# Patient Record
Sex: MALE | Race: BLACK OR AFRICAN AMERICAN | NOT HISPANIC OR LATINO | Employment: OTHER | URBAN - METROPOLITAN AREA
[De-identification: names, ages, dates, MRNs, and addresses within clinical notes are randomized per-mention and may not be internally consistent; named-entity substitution may affect disease eponyms.]

---

## 2018-10-22 ENCOUNTER — HOSPITAL ENCOUNTER (OUTPATIENT)
Dept: ORTHOPEDIC SURGERY | Facility: CLINIC | Age: 63
Discharge: HOME | End: 2018-10-22
Attending: NEUROLOGICAL SURGERY | Admitting: NEUROLOGICAL SURGERY

## 2020-08-13 ENCOUNTER — TRANSCRIBE ORDERS (OUTPATIENT)
Dept: ADMINISTRATIVE | Facility: HOSPITAL | Age: 65
End: 2020-08-13

## 2020-08-13 DIAGNOSIS — G62.9 PERIPHERAL NERVE DISORDER: Primary | ICD-10-CM

## 2020-08-21 ENCOUNTER — HOSPITAL ENCOUNTER (OUTPATIENT)
Dept: CARDIOLOGY | Facility: HOSPITAL | Age: 65
Discharge: HOME | End: 2020-08-21
Admitting: NURSE PRACTITIONER

## 2020-08-21 DIAGNOSIS — G62.9 PERIPHERAL NERVE DISORDER: ICD-10-CM

## 2020-08-21 LAB
BH CV LOWER ARTERIAL LEFT ABI RATIO: 1.22
BH CV LOWER ARTERIAL LEFT DORSALIS PEDIS SYS MAX: 147 MMHG
BH CV LOWER ARTERIAL LEFT GREAT TOE SYS MAX: 143 MMHG
BH CV LOWER ARTERIAL LEFT POST TIBIAL SYS MAX: 148 MMHG
BH CV LOWER ARTERIAL LEFT TBI RATIO: 1.18
BH CV LOWER ARTERIAL RIGHT ABI RATIO: 1.12
BH CV LOWER ARTERIAL RIGHT DORSALIS PEDIS SYS MAX: 135 MMHG
BH CV LOWER ARTERIAL RIGHT GREAT TOE SYS MAX: 95 MMHG
BH CV LOWER ARTERIAL RIGHT POST TIBIAL SYS MAX: 129 MMHG
BH CV LOWER ARTERIAL RIGHT TBI RATIO: 0.79
UPPER ARTERIAL LEFT ARM BRACHIAL SYS MAX: 117 MMHG
UPPER ARTERIAL RIGHT ARM BRACHIAL SYS MAX: 121 MMHG

## 2020-08-21 PROCEDURE — 93922 UPR/L XTREMITY ART 2 LEVELS: CPT

## 2020-08-31 ENCOUNTER — OFFICE VISIT (OUTPATIENT)
Dept: SURGERY | Facility: CLINIC | Age: 65
End: 2020-08-31

## 2020-08-31 VITALS
HEART RATE: 77 BPM | TEMPERATURE: 96.8 F | OXYGEN SATURATION: 99 % | BODY MASS INDEX: 31.69 KG/M2 | SYSTOLIC BLOOD PRESSURE: 122 MMHG | HEIGHT: 72 IN | DIASTOLIC BLOOD PRESSURE: 78 MMHG | WEIGHT: 234 LBS

## 2020-08-31 DIAGNOSIS — K43.2 RECURRENT INCISIONAL HERNIA: Primary | ICD-10-CM

## 2020-08-31 PROCEDURE — 99204 OFFICE O/P NEW MOD 45 MIN: CPT | Performed by: SURGERY

## 2020-08-31 RX ORDER — OMEPRAZOLE 20 MG/1
20 CAPSULE, DELAYED RELEASE ORAL DAILY
COMMUNITY

## 2020-08-31 RX ORDER — COLCHICINE 0.6 MG/1
0.6 TABLET ORAL 2 TIMES DAILY
COMMUNITY
Start: 2020-07-14

## 2020-08-31 RX ORDER — LISINOPRIL AND HYDROCHLOROTHIAZIDE 20; 12.5 MG/1; MG/1
2 TABLET ORAL DAILY
COMMUNITY
Start: 2020-08-13

## 2020-08-31 NOTE — PROGRESS NOTES
"Subjective   Jesus Main is a 65 y.o. male.   Chief Complaint   Patient presents with   • Hernia     ventral hernia     /78   Pulse 77   Temp 96.8 °F (36 °C) (Temporal)   Ht 182.9 cm (72\")   Wt 106 kg (234 lb)   SpO2 99%   BMI 31.74 kg/m²     HISTORY OF PRESENT ILLNESS:  Patient is a very pleasant 65-year-old gentleman.  He had a hernia repair by Dr. Beth navas about 10 years ago.  About 8 or 9 months ago he noticed a bulge recurring in his epigastric region.  He also noticed a burning pain in this area.  He denies any obstructive symptoms.  The defect is been getting bigger and he therefore was referred here by his primary care provider for evaluation and repair of recurrent hernia      The following portions of the patient's history were reviewed and updated as appropriate: allergies, current medications, past family history, past medical history, past social history, past surgical history and problem list.    Review of Systems   Constitutional: Negative for activity change and chills.   HENT: Negative for sore throat and voice change.    Eyes: Negative for blurred vision.   Respiratory: Negative for chest tightness.    Cardiovascular: Negative for chest pain.   Gastrointestinal: Positive for abdominal distention. Negative for abdominal pain.   Endocrine: Negative for cold intolerance.   Genitourinary: Negative for urgency.   Musculoskeletal: Negative for arthralgias.   Skin: Negative for color change.   Neurological: Negative for syncope and confusion.   Hematological: Does not bruise/bleed easily.   Psychiatric/Behavioral: Negative for hallucinations.       Objective   Physical Exam   Constitutional: He is oriented to person, place, and time. He appears well-developed and well-nourished.   HENT:   Head: Normocephalic and atraumatic.   Eyes: EOM are normal.   Neck: Normal range of motion.   Cardiovascular: Normal rate.   Pulmonary/Chest: Effort normal.   Abdominal: Soft.   Palpable " supraumbilical recurrent ventral incisional hernia the hernia is about 8 to 9 cm across however the defect is not palpable.   Genitourinary:   Genitourinary Comments: Deferred   Musculoskeletal: Normal range of motion.   Neurological: He is alert and oriented to person, place, and time.   Skin: Skin is warm and dry.   Psychiatric: He has a normal mood and affect.         Assessment/Plan   Jesus was seen today for hernia.    Diagnoses and all orders for this visit:    Recurrent incisional hernia    Prior to making a decision about treatment planning I would like to check a CT abdomen pelvis to better assess the nature of the hernia defect in the abdominal wall.  This will help me plan the nature of the pair and whether or not a component separation will be necessary.  I will get this set up for him and see him in follow-up after that.    Lesia Gamboa MD  8/31/2020  10:07 AM

## 2020-09-02 ENCOUNTER — LAB (OUTPATIENT)
Dept: LAB | Facility: HOSPITAL | Age: 65
End: 2020-09-02

## 2020-09-02 ENCOUNTER — OFFICE VISIT (OUTPATIENT)
Dept: PODIATRY | Facility: CLINIC | Age: 65
End: 2020-09-02

## 2020-09-02 VITALS
SYSTOLIC BLOOD PRESSURE: 107 MMHG | BODY MASS INDEX: 31.69 KG/M2 | DIASTOLIC BLOOD PRESSURE: 74 MMHG | WEIGHT: 234 LBS | HEART RATE: 52 BPM | HEIGHT: 72 IN

## 2020-09-02 DIAGNOSIS — M76.62 ACHILLES TENDINITIS OF BOTH LOWER EXTREMITIES: ICD-10-CM

## 2020-09-02 DIAGNOSIS — M19.071 ARTHRITIS OF BOTH FEET: ICD-10-CM

## 2020-09-02 DIAGNOSIS — M19.072 ARTHRITIS OF BOTH FEET: ICD-10-CM

## 2020-09-02 DIAGNOSIS — M10.072 ACUTE IDIOPATHIC GOUT OF LEFT ANKLE: ICD-10-CM

## 2020-09-02 DIAGNOSIS — K43.2 RECURRENT INCISIONAL HERNIA: Primary | ICD-10-CM

## 2020-09-02 DIAGNOSIS — M10.9 ACUTE GOUT OF ANKLE, UNSPECIFIED CAUSE, UNSPECIFIED LATERALITY: ICD-10-CM

## 2020-09-02 DIAGNOSIS — M25.572 ACUTE BILATERAL ANKLE PAIN: ICD-10-CM

## 2020-09-02 DIAGNOSIS — M25.572 ACUTE BILATERAL ANKLE PAIN: Primary | ICD-10-CM

## 2020-09-02 DIAGNOSIS — M25.571 ACUTE BILATERAL ANKLE PAIN: ICD-10-CM

## 2020-09-02 DIAGNOSIS — M76.61 ACHILLES TENDINITIS OF BOTH LOWER EXTREMITIES: ICD-10-CM

## 2020-09-02 DIAGNOSIS — M25.571 ACUTE BILATERAL ANKLE PAIN: Primary | ICD-10-CM

## 2020-09-02 LAB
ANION GAP SERPL CALCULATED.3IONS-SCNC: 8.9 MMOL/L (ref 5–15)
BASOPHILS # BLD AUTO: 0.04 10*3/MM3 (ref 0–0.2)
BASOPHILS NFR BLD AUTO: 0.5 % (ref 0–1.5)
BUN SERPL-MCNC: 18 MG/DL (ref 8–23)
BUN/CREAT SERPL: 12.4 (ref 7–25)
CALCIUM SPEC-SCNC: 9.7 MG/DL (ref 8.6–10.5)
CHLORIDE SERPL-SCNC: 103 MMOL/L (ref 98–107)
CO2 SERPL-SCNC: 26.1 MMOL/L (ref 22–29)
CREAT SERPL-MCNC: 1.45 MG/DL (ref 0.76–1.27)
CRP SERPL-MCNC: 3 MG/DL (ref 0–0.5)
DEPRECATED RDW RBC AUTO: 40.9 FL (ref 37–54)
EOSINOPHIL # BLD AUTO: 0.21 10*3/MM3 (ref 0–0.4)
EOSINOPHIL NFR BLD AUTO: 2.9 % (ref 0.3–6.2)
ERYTHROCYTE [DISTWIDTH] IN BLOOD BY AUTOMATED COUNT: 12.7 % (ref 12.3–15.4)
ERYTHROCYTE [SEDIMENTATION RATE] IN BLOOD: 59 MM/HR (ref 0–20)
GFR SERPL CREATININE-BSD FRML MDRD: 59 ML/MIN/1.73
GLUCOSE SERPL-MCNC: 96 MG/DL (ref 65–99)
HCT VFR BLD AUTO: 36.3 % (ref 37.5–51)
HGB BLD-MCNC: 12.4 G/DL (ref 13–17.7)
IMM GRANULOCYTES # BLD AUTO: 0.03 10*3/MM3 (ref 0–0.05)
IMM GRANULOCYTES NFR BLD AUTO: 0.4 % (ref 0–0.5)
LYMPHOCYTES # BLD AUTO: 0.94 10*3/MM3 (ref 0.7–3.1)
LYMPHOCYTES NFR BLD AUTO: 12.8 % (ref 19.6–45.3)
MCH RBC QN AUTO: 30.1 PG (ref 26.6–33)
MCHC RBC AUTO-ENTMCNC: 34.2 G/DL (ref 31.5–35.7)
MCV RBC AUTO: 88.1 FL (ref 79–97)
MONOCYTES # BLD AUTO: 0.94 10*3/MM3 (ref 0.1–0.9)
MONOCYTES NFR BLD AUTO: 12.8 % (ref 5–12)
NEUTROPHILS NFR BLD AUTO: 5.2 10*3/MM3 (ref 1.7–7)
NEUTROPHILS NFR BLD AUTO: 70.6 % (ref 42.7–76)
NRBC BLD AUTO-RTO: 0 /100 WBC (ref 0–0.2)
PLATELET # BLD AUTO: 209 10*3/MM3 (ref 140–450)
PMV BLD AUTO: 11.7 FL (ref 6–12)
POTASSIUM SERPL-SCNC: 4 MMOL/L (ref 3.5–5.2)
RBC # BLD AUTO: 4.12 10*6/MM3 (ref 4.14–5.8)
SODIUM SERPL-SCNC: 138 MMOL/L (ref 136–145)
URATE SERPL-MCNC: 10.5 MG/DL (ref 3.4–7)
WBC # BLD AUTO: 7.36 10*3/MM3 (ref 3.4–10.8)

## 2020-09-02 PROCEDURE — 36415 COLL VENOUS BLD VENIPUNCTURE: CPT

## 2020-09-02 PROCEDURE — 99203 OFFICE O/P NEW LOW 30 MIN: CPT | Performed by: PODIATRIST

## 2020-09-02 PROCEDURE — 85652 RBC SED RATE AUTOMATED: CPT

## 2020-09-02 PROCEDURE — 85025 COMPLETE CBC W/AUTO DIFF WBC: CPT

## 2020-09-02 PROCEDURE — 84550 ASSAY OF BLOOD/URIC ACID: CPT

## 2020-09-02 PROCEDURE — 97760 ORTHOTIC MGMT&TRAING 1ST ENC: CPT | Performed by: PODIATRIST

## 2020-09-02 PROCEDURE — 80048 BASIC METABOLIC PNL TOTAL CA: CPT

## 2020-09-02 PROCEDURE — 86140 C-REACTIVE PROTEIN: CPT

## 2020-09-02 RX ORDER — CLOTRIMAZOLE AND BETAMETHASONE DIPROPIONATE 10; .64 MG/G; MG/G
CREAM TOPICAL 2 TIMES DAILY
Qty: 45 G | Refills: 1 | Status: SHIPPED | OUTPATIENT
Start: 2020-09-02 | End: 2021-06-10

## 2020-09-02 RX ORDER — METHYLPREDNISOLONE 4 MG/1
TABLET ORAL
Qty: 21 TABLET | Refills: 0 | Status: SHIPPED | OUTPATIENT
Start: 2020-09-02 | End: 2020-09-16

## 2020-09-03 NOTE — PATIENT INSTRUCTIONS
Gout    Gout is a condition that causes painful swelling of the joints. Gout is a type of inflammation of the joints (arthritis). This condition is caused by having too much uric acid in the body. Uric acid is a chemical that forms when the body breaks down substances called purines. Purines are important for building body proteins.  When the body has too much uric acid, sharp crystals can form and build up inside the joints. This causes pain and swelling. Gout attacks can happen quickly and may be very painful (acute gout). Over time, the attacks can affect more joints and become more frequent (chronic gout). Gout can also cause uric acid to build up under the skin and inside the kidneys.  What are the causes?  This condition is caused by too much uric acid in your blood. This can happen because:  · Your kidneys do not remove enough uric acid from your blood. This is the most common cause.  · Your body makes too much uric acid. This can happen with some cancers and cancer treatments. It can also occur if your body is breaking down too many red blood cells (hemolytic anemia).  · You eat too many foods that are high in purines. These foods include organ meats and some seafood. Alcohol, especially beer, is also high in purines.  A gout attack may be triggered by trauma or stress.  What increases the risk?  You are more likely to develop this condition if you:  · Have a family history of gout.  · Are male and middle-aged.  · Are female and have gone through menopause.  · Are obese.  · Frequently drink alcohol, especially beer.  · Are dehydrated.  · Lose weight too quickly.  · Have an organ transplant.  · Have lead poisoning.  · Take certain medicines, including aspirin, cyclosporine, diuretics, levodopa, and niacin.  · Have kidney disease.  · Have a skin condition called psoriasis.  What are the signs or symptoms?  An attack of acute gout happens quickly. It usually occurs in just one joint. The most common place is  the big toe. Attacks often start at night. Other joints that may be affected include joints of the feet, ankle, knee, fingers, wrist, or elbow. Symptoms of this condition may include:  · Severe pain.  · Warmth.  · Swelling.  · Stiffness.  · Tenderness. The affected joint may be very painful to touch.  · Shiny, red, or purple skin.  · Chills and fever.  Chronic gout may cause symptoms more frequently. More joints may be involved. You may also have white or yellow lumps (tophi) on your hands or feet or in other areas near your joints.  How is this diagnosed?  This condition is diagnosed based on your symptoms, medical history, and physical exam. You may have tests, such as:  · Blood tests to measure uric acid levels.  · Removal of joint fluid with a thin needle (aspiration) to look for uric acid crystals.  · X-rays to look for joint damage.  How is this treated?  Treatment for this condition has two phases: treating an acute attack and preventing future attacks. Acute gout treatment may include medicines to reduce pain and swelling, including:  · NSAIDs.  · Steroids. These are strong anti-inflammatory medicines that can be taken by mouth (orally) or injected into a joint.  · Colchicine. This medicine relieves pain and swelling when it is taken soon after an attack. It can be given by mouth or through an IV.  Preventive treatment may include:  · Daily use of smaller doses of NSAIDs or colchicine.  · Use of a medicine that reduces uric acid levels in your blood.  · Changes to your diet. You may need to see a dietitian about what to eat and drink to prevent gout.  Follow these instructions at home:  During a gout attack    · If directed, put ice on the affected area:  ? Put ice in a plastic bag.  ? Place a towel between your skin and the bag.  ? Leave the ice on for 20 minutes, 2-3 times a day.  · Raise (elevate) the affected joint above the level of your heart as often as possible.  · Rest the joint as much as possible.  If the affected joint is in your leg, you may be given crutches to use.  · Follow instructions from your health care provider about eating or drinking restrictions.  Avoiding future gout attacks  · Follow a low-purine diet as told by your dietitian or health care provider. Avoid foods and drinks that are high in purines, including liver, kidney, anchovies, asparagus, herring, mushrooms, mussels, and beer.  · Maintain a healthy weight or lose weight if you are overweight. If you want to lose weight, talk with your health care provider. It is important that you do not lose weight too quickly.  · Start or maintain an exercise program as told by your health care provider.  Eating and drinking  · Drink enough fluids to keep your urine pale yellow.  · If you drink alcohol:  ? Limit how much you use to:  § 0-1 drink a day for women.  § 0-2 drinks a day for men.  ? Be aware of how much alcohol is in your drink. In the [place] one drink equals one 12 oz bottle of beer (355 mL) one 5 oz glass of wine (148 mL), or one 1½ oz glass of hard liquor (44 mL).  General instructions  · Take over-the-counter and prescription medicines only as told by your health care provider.  · Do not drive or use heavy machinery while taking prescription pain medicine.  · Return to your normal activities as told by your health care provider. Ask your health care provider what activities are safe for you.  · Keep all follow-up visits as told by your health care provider. This is important.  Contact a health care provider if you have:  · Another gout attack.  · Continuing symptoms of a gout attack after 10 days of treatment.  · Side effects from your medicines.  · Chills or a fever.  · Burning pain when you urinate.  · Pain in your lower back or belly.  Get help right away if you:  · Have severe or uncontrolled pain.  · Cannot urinate.  Summary  · Gout is painful swelling of the joints caused by inflammation.  · The most common site of pain is the big  toe, but it can affect other joints in the body.  · Medicines and dietary changes can help to prevent and treat gout attacks.  This information is not intended to replace advice given to you by your health care provider. Make sure you discuss any questions you have with your health care provider.  Document Released: 12/15/2001 Document Revised: 07/10/2019 Document Reviewed: 07/10/2019  Elsevier Patient Education © 2020 [employer]

## 2020-09-03 NOTE — PROGRESS NOTES
09/02/2020  Foot and Ankle Surgery - New Patient   Provider: Dr. Edmundo Parra DPM  Location: [facility]    Subjective:  Jesus Main is a 65 y.o. male.     Chief Complaint   Patient presents with   • Right Ankle - Pain   • Left Ankle - Pain       HPI: Patient is a 65-year-old male that presents with bilateral ankle pain, left significantly worse than right.  He states that the symptoms have been intermittently present for approximately 8 to 9 months.  Recently he has noticed significant exacerbation since his primary care physician discontinued the colchicine.  He has been diagnosed with gout in the past.  He states that he was taking the colchicine on a daily basis a few times a day.  He is unaware of any previous treatment with allopurinol or Uloric.  He localizes the majority of the discomfort to the Achilles tendon and peroneal tendon region of the left ankle.  He rates the pain an 8 out of 10 today.  Patient has been soaking his feet and ice which has offered mild improvement.  He denies any recent injury.  He also complains of dry skin/eczema to the medial aspect of the right ankle.  He has tried over-the-counter topicals with no improvement.    No Known Allergies    Past Medical History:   Diagnosis Date   • Gout        Past Surgical History:   Procedure Laterality Date   • HERNIA REPAIR         Family History   Problem Relation Age of Onset   • Hypertension Mother    • Heart disease Mother    • Cancer Father        Social History     Socioeconomic History   • Marital status:      Spouse name: Not on file   • Number of children: Not on file   • Years of education: Not on file   • Highest education level: Not on file   Tobacco Use   • Smoking status: Never Smoker   • Smokeless tobacco: Never Used   Substance and Sexual Activity   • Alcohol use: Yes     Comment: occasional   • Drug use: Never   • Sexual activity: Defer        Current Outpatient Medications on File Prior to Visit  "  Medication Sig Dispense Refill   • colchicine 0.6 MG tablet Take 0.6 mg by mouth 2 (Two) Times a Day.     • lisinopril-hydrochlorothiazide (PRINZIDE,ZESTORETIC) 20-12.5 MG per tablet Take 2 tablets by mouth Daily.     • Multiple Vitamins-Minerals (VISIVITES) tablet VISIVITES TABS     • omeprazole (priLOSEC) 20 MG capsule Take 20 mg by mouth Daily.       No current facility-administered medications on file prior to visit.        Review of Systems:  General: Denies fever, chills, fatigue, and weakness.  Eyes: Denies vision loss, blurry vision, and excessive redness.  ENT: Denies hearing issues and difficulty swallowing.  Cardiovascular: Denies palpitations, chest pain, or syncopal episodes.  Respiratory: Denies shortness of breath, wheezing, and coughing.  GI: Denies abdominal pain, nausea, and vomiting.   : Denies frequency, hematuria, and urgency.  Musculoskeletal: + Bilateral ankle pain  Derm: Denies rash, open wounds, or suspicious lesions.  Neuro: Denies headaches, numbness, loss of coordination, and tremors.  Psych: Denies anxiety and depression.  Endocrine: Denies temperature intolerance and changes in appetite.  Heme: Denies bleeding disorders or abnormal bruising.     Objective   /74   Pulse 52   Ht 182.9 cm (72\")   Wt 106 kg (234 lb)   BMI 31.74 kg/m²     Foot/Ankle Exam:       General:   Appearance: appears stated age and healthy    Orientation: AAOx3    Affect: appropriate    Gait: antalgic      VASCULAR      Right Foot Vascularity   Normal vascular exam    Dorsalis pedis:  2+  Posterior tibial:  2+  Skin Temperature: warm    Edema Grading:  None and 1+  CFT:  < 3 seconds  Pedal Hair Growth:  Present     Left Foot Vascularity   Normal vascular exam    Dorsalis pedis:  2+  Posterior tibial:  2+  Skin Temperature: warm    Edema Grading:  None and 2+  CFT:  < 3 seconds  Pedal Hair Growth:  Present      NEUROLOGIC     Right Foot Neurologic   Light touch sensation:  Normal  Hot/Cold sensation: " normal    Achilles reflex:  2+     Left Foot Neurologic   Light touch sensation:  Normal  Hot/cold sensation: normal    Achilles reflex:  2+     MUSCULOSKELETAL      Right Foot Musculoskeletal   Ecchymosis:  None  Tenderness: calcaneus tenderness and achilles    Arch:  Pes planus  Hallux valgus: Yes    Hallux limitus: Yes       Left Foot Musculoskeletal   Ecchymosis:  None  Tenderness: calcaneus tenderness and subtalar joint    Tenderness comment:  Significant discomfort with palpation to the heel and lateral aspect of the ankle  Arch:  Pes planus  Hallux valgus: Yes    Hallux limitus: Yes       MUSCLE STRENGTH     Right Foot Muscle Strength   Normal strength    Foot dorsiflexion:  5  Foot plantar flexion:  5  Foot inversion:  5  Foot eversion:  5     Left Foot Muscle Strength   Normal strength    Foot dorsiflexion:  5  Foot plantar flexion:  5  Foot inversion:  5  Foot eversion:  5     DERMATOLOGIC     Right Foot Dermatologic   Skin: dry skin    Skin comment:  Eczema noted to the medial aspect of the ankle     Left Foot Dermatologic   Skin: skin intact    Skin comment:  Mild calor to the posterior ankle region      Right Foot Additional Comments Mild discomfort to the Achilles tendon insertion.  Moderate soft tissue and joint rigidity to both foot and ankle.  Moderate equinus contracture with knee extended and flexed, bilateral      Left Foot Additional Comments: Moderate discomfort with palpation to the posterior lateral aspect of the ankle and Achilles tendon region.      Assessment/Plan   Jesus was seen today for pain and pain.    Diagnoses and all orders for this visit:    Acute bilateral ankle pain  -     XR Ankle 3+ View Bilateral  -     CBC & Differential; Future  -     Basic Metabolic Panel; Future  -     Sedimentation Rate; Future  -     C-reactive Protein; Future  -     Uric Acid; Future    Acute idiopathic gout of left ankle  -     CBC & Differential; Future  -     Basic Metabolic Panel; Future  -      Sedimentation Rate; Future  -     C-reactive Protein; Future  -     Uric Acid; Future    Arthritis of both feet    Achilles tendinitis of both lower extremities    Other orders  -     methylPREDNISolone (MEDROL) 4 MG dose pack; Take as directed  -     clotrimazole-betamethasone (LOTRISONE) 1-0.05 % cream; Apply  topically to the appropriate area as directed 2 (Two) Times a Day. Apply to affected area twice daily      Patient was referred to me by his primary care physician for evaluation of bilateral ankle pain, left greater than right.  He does have swelling, calor, and discomfort involving the posterior lateral aspect of the left ankle.  He denies any injury but states that he has been diagnosed and treated for gout in the past.  He was recently taken off his colchicine and has noticed significant increase in pain involving the ankle.  Imaging was reviewed showing moderate degenerative changes as well as spur formation to the posterior aspect of the calcaneus.  No fractures or dislocations are identified.  I explained that his symptoms are consistent with inflammation and could be secondary to gout.  I have recommended that we proceed with lab work including CBC, BMP, ESR, CRP, and uric acid.  I have recommended that he proceed with a cam boot for offloading.  Greater than 15 minutes was spent reviewing the proper use and effects of the boot.  I have also prescribed a Medrol Dosepak as well as Lotrisone for the skin changes involving his right ankle.  I would like him to monitor his improvement and follow-up with me in 1 to 2 weeks for reevaluation and bilateral weightbearing foot imaging.    Orders Placed This Encounter   Procedures   • XR Ankle 3+ View Bilateral     Order Specific Question:   Reason for Exam:     Answer:   Bilateral ankle pain with swelling and pain in the back of the heel RM 13 WB     Order Specific Question:   Does this patient have a diabetic monitoring/medication delivering device on?      Answer:   No   • Basic Metabolic Panel     Standing Status:   Future     Number of Occurrences:   1     Standing Expiration Date:   9/3/2021   • Sedimentation Rate     Standing Status:   Future     Number of Occurrences:   1     Standing Expiration Date:   9/3/2021   • C-reactive Protein     Standing Status:   Future     Number of Occurrences:   1     Standing Expiration Date:   9/3/2021   • Uric Acid     Standing Status:   Future     Number of Occurrences:   1     Standing Expiration Date:   9/3/2021   • CBC & Differential     Standing Status:   Future     Number of Occurrences:   1     Standing Expiration Date:   9/3/2021     Order Specific Question:   Manual Differential     Answer:   No        Note is dictated utilizing voice recognition software. Unfortunately this leads to occasional typographical errors. I apologize in advance if the situation occurs. If questions occur please do not hesitate to call our office.

## 2020-09-12 ENCOUNTER — HOSPITAL ENCOUNTER (OUTPATIENT)
Dept: CT IMAGING | Facility: HOSPITAL | Age: 65
Discharge: HOME | End: 2020-09-12
Admitting: SURGERY

## 2020-09-12 DIAGNOSIS — K43.2 RECURRENT INCISIONAL HERNIA: ICD-10-CM

## 2020-09-12 PROCEDURE — 0 IOPAMIDOL PER 1 ML: Performed by: SURGERY

## 2020-09-12 PROCEDURE — 74177 CT ABD & PELVIS W/CONTRAST: CPT

## 2020-09-12 RX ADMIN — IOPAMIDOL 100 ML: 755 INJECTION, SOLUTION INTRAVENOUS at 11:45

## 2020-09-16 ENCOUNTER — OFFICE VISIT (OUTPATIENT)
Dept: PODIATRY | Facility: CLINIC | Age: 65
End: 2020-09-16

## 2020-09-16 VITALS
SYSTOLIC BLOOD PRESSURE: 112 MMHG | BODY MASS INDEX: 31.06 KG/M2 | HEART RATE: 77 BPM | WEIGHT: 229 LBS | DIASTOLIC BLOOD PRESSURE: 74 MMHG

## 2020-09-16 DIAGNOSIS — M76.62 ACHILLES TENDINITIS OF BOTH LOWER EXTREMITIES: ICD-10-CM

## 2020-09-16 DIAGNOSIS — M10.072 ACUTE IDIOPATHIC GOUT OF LEFT ANKLE: ICD-10-CM

## 2020-09-16 DIAGNOSIS — M25.571 ACUTE BILATERAL ANKLE PAIN: Primary | ICD-10-CM

## 2020-09-16 DIAGNOSIS — M19.072 ARTHRITIS OF BOTH FEET: ICD-10-CM

## 2020-09-16 DIAGNOSIS — M19.071 ARTHRITIS OF BOTH FEET: ICD-10-CM

## 2020-09-16 DIAGNOSIS — M25.572 ACUTE BILATERAL ANKLE PAIN: Primary | ICD-10-CM

## 2020-09-16 DIAGNOSIS — M76.61 ACHILLES TENDINITIS OF BOTH LOWER EXTREMITIES: ICD-10-CM

## 2020-09-16 PROCEDURE — 99213 OFFICE O/P EST LOW 20 MIN: CPT | Performed by: PODIATRIST

## 2020-09-17 NOTE — PROGRESS NOTES
09/16/2020  Foot and Ankle Surgery - Established Patient/Follow-up  Provider: Dr. Edmundo Parra DPM  Location: [facility]    Subjective:  Jesus Main is a 65 y.o. male.     Chief Complaint   Patient presents with   • Right Ankle - Follow-up   • Left Ankle - Follow-up       HPI: Patient is feeling much better at this time.  He no longer has substantial pain involving the Achilles tendon or the feet.  He did take the Medrol Dosepak and also feels that the Lotrisone is helping the medial aspect of the right ankle.  Denies any other issues today.  He has been performing daily activities without any significant pain or limitation.    No Known Allergies    Current Outpatient Medications on File Prior to Visit   Medication Sig Dispense Refill   • clotrimazole-betamethasone (LOTRISONE) 1-0.05 % cream Apply  topically to the appropriate area as directed 2 (Two) Times a Day. Apply to affected area twice daily 45 g 1   • colchicine 0.6 MG tablet Take 0.6 mg by mouth 2 (Two) Times a Day.     • lisinopril-hydrochlorothiazide (PRINZIDE,ZESTORETIC) 20-12.5 MG per tablet Take 2 tablets by mouth Daily.     • Multiple Vitamins-Minerals (VISIVITES) tablet VISIVITES TABS     • omeprazole (priLOSEC) 20 MG capsule Take 20 mg by mouth Daily.       No current facility-administered medications on file prior to visit.        Objective   /74   Pulse 77   Wt 104 kg (229 lb)   BMI 31.06 kg/m²      General:   Appearance: appears stated age and healthy    Orientation: AAOx3    Affect: appropriate    Gait: antalgic       VASCULAR       Right Foot Vascularity   Normal vascular exam    Dorsalis pedis:  2+  Posterior tibial:  2+  Skin Temperature: warm    Edema Grading:  None and 1+  CFT:  < 3 seconds  Pedal Hair Growth:  Present      Left Foot Vascularity   Normal vascular exam    Dorsalis pedis:  2+  Posterior tibial:  2+  Skin Temperature: warm    Edema Grading:  None and 2+  CFT:  < 3 seconds  Pedal Hair Growth:   Present      NEUROLOGIC      Right Foot Neurologic   Light touch sensation:  Normal  Hot/Cold sensation: normal    Achilles reflex:  2+      Left Foot Neurologic   Light touch sensation:  Normal  Hot/cold sensation: normal    Achilles reflex:  2+      MUSCULOSKELETAL       Right Foot Musculoskeletal   Ecchymosis:  None  Tenderness: calcaneus tenderness and achilles    Arch:  Pes planus  Hallux valgus: Yes    Hallux limitus: Yes        Left Foot Musculoskeletal   Ecchymosis:  None  Tenderness: calcaneus tenderness and subtalar joint    Tenderness comment:  Significant discomfort with palpation to the heel and lateral aspect of the ankle  Arch:  Pes planus  Hallux valgus: Yes    Hallux limitus: Yes        MUSCLE STRENGTH      Right Foot Muscle Strength   Normal strength    Foot dorsiflexion:  5  Foot plantar flexion:  5  Foot inversion:  5  Foot eversion:  5      Left Foot Muscle Strength   Normal strength    Foot dorsiflexion:  5  Foot plantar flexion:  5  Foot inversion:  5  Foot eversion:  5      DERMATOLOGIC      Right Foot Dermatologic   Skin: dry skin    Skin comment:  Eczema improved to the ankle      Left Foot Dermatologic   Skin: skin intact       Additional comments: No substantial discomfort with palpation today.  No progressive deformity or instability    Assessment/Plan   Jesus was seen today for follow-up and follow-up.    Diagnoses and all orders for this visit:    Acute bilateral ankle pain  -     Cancel: XR Ankle 3+ View Bilateral; Future  -     Cancel: XR Ankle 3+ View Bilateral; Future  -     XR Foot 3+ View Bilateral  -     Basic Metabolic Panel; Future  -     Uric Acid; Future    Arthritis of both feet  -     Cancel: XR Ankle 3+ View Bilateral; Future  -     Cancel: XR Ankle 3+ View Bilateral; Future  -     XR Foot 3+ View Bilateral  -     Basic Metabolic Panel; Future  -     Uric Acid; Future    Acute idiopathic gout of left ankle    Achilles tendinitis of both lower extremities  -     Cancel:  XR Ankle 3+ View Bilateral; Future  -     Cancel: XR Ankle 3+ View Bilateral; Future  -     XR Foot 3+ View Bilateral      Patient has responded to the oral steroid quite well.  His lab work was reviewed which showed significantly elevated uric acid as well as creatinine.  I explained that his creatinine may have been elevated due to excessive use of colchicine versus consideration for chronic kidney disease.  I would like him to avoid any use of colchicine at this time.  No additional medical care and I would like to proceed with a period of observation.  I would like to see him in 4 weeks for reevaluation and to have a uric acid and BMP performed prior to evaluation.  If uric acid remains elevated, may need to consider treatment with allopurinol.  If his creatinine remains elevated, he will need to discuss this issue with his primary care physician.    Orders Placed This Encounter   Procedures   • XR Foot 3+ View Bilateral     Weight Bearing, rm 15, follow up     Order Specific Question:   Reason for Exam:     Answer:   follow up feet pain   • Basic Metabolic Panel     Standing Status:   Future     Standing Expiration Date:   9/17/2021   • Uric Acid     Standing Status:   Future     Standing Expiration Date:   9/17/2021          Note is dictated utilizing voice recognition software. Unfortunately this leads to occasional typographical errors. I apologize in advance if the situation occurs. If questions occur please do not hesitate to call our office.

## 2020-09-21 ENCOUNTER — OFFICE VISIT (OUTPATIENT)
Dept: SURGERY | Facility: CLINIC | Age: 65
End: 2020-09-21

## 2020-09-21 ENCOUNTER — PREP FOR SURGERY (OUTPATIENT)
Dept: OTHER | Facility: HOSPITAL | Age: 65
End: 2020-09-21

## 2020-09-21 VITALS — TEMPERATURE: 97.3 F

## 2020-09-21 DIAGNOSIS — K43.2 RECURRENT INCISIONAL HERNIA: Primary | ICD-10-CM

## 2020-09-21 PROCEDURE — 99214 OFFICE O/P EST MOD 30 MIN: CPT | Performed by: SURGERY

## 2020-09-21 NOTE — PROGRESS NOTES
Subjective   Jesus Main is a 65 y.o. male.   Chief Complaint   Patient presents with   • Follow-up     Temp 97.3 °F (36.3 °C) (Temporal)     HISTORY OF PRESENT ILLNESS:  Patient is a very pleasant 65-year-old gentleman here for follow-up status post CT scan of the abdomen and pelvis to better assess his abdominal wall anatomy.  He does in fact have a widemouth ventral hernia with a midline defect measuring approximately 7 cm.  It remains chronically incarcerated.  CT scan also noted gallstones and of queried him about any symptoms related to this which he denies.      The following portions of the patient's history were reviewed and updated as appropriate: allergies, current medications, past family history, past medical history, past social history, past surgical history and problem list.    Review of Systems   Constitutional: Negative for activity change and chills.   HENT: Negative for sore throat and voice change.    Eyes: Negative for blurred vision.   Respiratory: Negative for chest tightness.    Cardiovascular: Negative for chest pain.   Gastrointestinal: Negative for abdominal pain.        Epigastric bulge consistent with known recurrent incisional hernia   Endocrine: Negative for cold intolerance.   Genitourinary: Negative for urgency.   Musculoskeletal: Negative for arthralgias.   Skin: Negative for color change.   Neurological: Negative for syncope and confusion.   Hematological: Does not bruise/bleed easily.   Psychiatric/Behavioral: Negative for hallucinations.       Objective   Physical Exam  Constitutional:       Appearance: He is well-developed.   HENT:      Head: Normocephalic and atraumatic.   Neck:      Musculoskeletal: Normal range of motion.   Cardiovascular:      Rate and Rhythm: Normal rate.   Pulmonary:      Effort: Pulmonary effort is normal.   Abdominal:      Palpations: Abdomen is soft.      Comments: Unchanged chronically incarcerated recurrent ventral incisional hernia in the  epigastrium   Genitourinary:     Comments: Deferred  Musculoskeletal: Normal range of motion.   Skin:     General: Skin is warm and dry.   Neurological:      Mental Status: He is alert and oriented to person, place, and time.           Assessment/Plan   Jesus was seen today for follow-up.    Diagnoses and all orders for this visit:    Recurrent incisional hernia    Patient would benefit from open recurrent ventral incisional hernia repair with mesh.  We discussed that because the size of his defect he may also require component separation.  I discussed with him that this will be more painful but allow for decrease in tension on his repair.  We will still use mesh either way.  I have discussed with him he would also need to stay in the hospital 1 or 2 nights and will also likely have a drain.  I also discussed the risk of general anesthesia bleeding infection and injury to surrounding structures.  Have also discussed that since he is not symptomatic from his gallstones we will treat the symptoms as an incidental finding for now however it may make cholecystectomy more challenging in the future.    Lesia Gmaboa MD  9/21/2020  10:10 AM EDT

## 2020-09-21 NOTE — H&P
Subjective   Jesus Main is a 65 y.o. male.   No chief complaint on file.    There were no vitals taken for this visit.    HISTORY OF PRESENT ILLNESS:  Patient is a very pleasant 65-year-old gentleman.  He had a hernia repair by Dr. Beth navas about 10 years ago.  About 8 or 9 months ago he noticed a bulge recurring in his epigastric region.  He also noticed a burning pain in this area.  He denies any obstructive symptoms.  The defect is been getting bigger and he therefore was referred here by his primary care provider for evaluation and repair of recurrent hernia      The following portions of the patient's history were reviewed and updated as appropriate: allergies, current medications, past family history, past medical history, past social history, past surgical history and problem list.    Review of Systems   Constitutional: Negative for activity change and chills.   HENT: Negative for sore throat and voice change.    Eyes: Negative for blurred vision.   Respiratory: Negative for chest tightness.    Cardiovascular: Negative for chest pain.   Gastrointestinal: Positive for abdominal distention. Negative for abdominal pain.   Endocrine: Negative for cold intolerance.   Genitourinary: Negative for urgency.   Musculoskeletal: Negative for arthralgias.   Skin: Negative for color change.   Neurological: Negative for syncope and confusion.   Hematological: Does not bruise/bleed easily.   Psychiatric/Behavioral: Negative for hallucinations.       Objective   Physical Exam   Constitutional: He is oriented to person, place, and time. He appears well-developed.   HENT:   Head: Normocephalic and atraumatic.   Neck: Normal range of motion.   Cardiovascular: Normal rate.   Pulmonary/Chest: Effort normal.   Abdominal: Soft.   Palpable supraumbilical recurrent ventral incisional hernia the hernia is about 8 to 9 cm across however the defect is not palpable.   Genitourinary:    Genitourinary Comments: Deferred      Musculoskeletal: Normal range of motion.   Neurological: He is alert and oriented to person, place, and time.   Skin: Skin is warm and dry.         Assessment/Plan   There are no diagnoses linked to this encounter.Prior to making a decision about treatment planning I would like to check a CT abdomen pelvis to better assess the nature of the hernia defect in the abdominal wall.  This will help me plan the nature of the pair and whether or not a component separation will be necessary.  I will get this set up for him and see him in follow-up after that.    Lesia Gamboa MD  9/21/2020  10:07 AM

## 2020-10-12 ENCOUNTER — LAB (OUTPATIENT)
Dept: LAB | Facility: HOSPITAL | Age: 65
End: 2020-10-12

## 2020-10-12 DIAGNOSIS — M19.071 ARTHRITIS OF BOTH FEET: ICD-10-CM

## 2020-10-12 DIAGNOSIS — M19.072 ARTHRITIS OF BOTH FEET: ICD-10-CM

## 2020-10-12 DIAGNOSIS — M25.571 ACUTE BILATERAL ANKLE PAIN: ICD-10-CM

## 2020-10-12 DIAGNOSIS — M25.572 ACUTE BILATERAL ANKLE PAIN: ICD-10-CM

## 2020-10-12 LAB
ANION GAP SERPL CALCULATED.3IONS-SCNC: 7.4 MMOL/L (ref 5–15)
BUN SERPL-MCNC: 13 MG/DL (ref 8–23)
BUN/CREAT SERPL: 11.1 (ref 7–25)
CALCIUM SPEC-SCNC: 9.3 MG/DL (ref 8.6–10.5)
CHLORIDE SERPL-SCNC: 108 MMOL/L (ref 98–107)
CO2 SERPL-SCNC: 25.6 MMOL/L (ref 22–29)
CREAT SERPL-MCNC: 1.17 MG/DL (ref 0.76–1.27)
GFR SERPL CREATININE-BSD FRML MDRD: 75 ML/MIN/1.73
GLUCOSE SERPL-MCNC: 88 MG/DL (ref 65–99)
POTASSIUM SERPL-SCNC: 4.1 MMOL/L (ref 3.5–5.2)
SODIUM SERPL-SCNC: 141 MMOL/L (ref 136–145)
URATE SERPL-MCNC: 9.1 MG/DL (ref 3.4–7)

## 2020-10-12 PROCEDURE — 84550 ASSAY OF BLOOD/URIC ACID: CPT

## 2020-10-12 PROCEDURE — 80048 BASIC METABOLIC PNL TOTAL CA: CPT

## 2020-10-12 PROCEDURE — 36415 COLL VENOUS BLD VENIPUNCTURE: CPT

## 2020-10-14 ENCOUNTER — OFFICE VISIT (OUTPATIENT)
Dept: PODIATRY | Facility: CLINIC | Age: 65
End: 2020-10-14

## 2020-10-14 VITALS
BODY MASS INDEX: 32.64 KG/M2 | HEIGHT: 72 IN | DIASTOLIC BLOOD PRESSURE: 83 MMHG | WEIGHT: 241 LBS | SYSTOLIC BLOOD PRESSURE: 154 MMHG | HEART RATE: 57 BPM

## 2020-10-14 DIAGNOSIS — M10.072 ACUTE IDIOPATHIC GOUT OF LEFT ANKLE: Primary | ICD-10-CM

## 2020-10-14 DIAGNOSIS — M19.071 ARTHRITIS OF BOTH FEET: ICD-10-CM

## 2020-10-14 DIAGNOSIS — M19.072 ARTHRITIS OF BOTH FEET: ICD-10-CM

## 2020-10-14 PROCEDURE — 99213 OFFICE O/P EST LOW 20 MIN: CPT | Performed by: PODIATRIST

## 2020-10-14 RX ORDER — ALLOPURINOL 300 MG/1
300 TABLET ORAL DAILY
Qty: 30 TABLET | Refills: 0 | Status: SHIPPED | OUTPATIENT
Start: 2020-10-14 | End: 2020-11-25

## 2020-10-14 RX ORDER — METHYLPREDNISOLONE 4 MG/1
TABLET ORAL
Qty: 21 TABLET | Refills: 0 | Status: SHIPPED | OUTPATIENT
Start: 2020-10-14 | End: 2020-11-25

## 2020-10-14 NOTE — PROGRESS NOTES
"10/14/2020  Foot and Ankle Surgery - Established Patient/Follow-up  Provider: Dr. Edmundo Parra DPM  Location: [facility]    Subjective:  Jesus Main is a 65 y.o. male.     Chief Complaint   Patient presents with   • Left Ankle - Follow-up   • Right Ankle - Follow-up       HPI: Patient returns for follow-up on bilateral foot pain.  He states that the pain has started to return.  He has been doing relatively well since last exam.  Discomfort is noticed intermittently and daily.  He rates his pain an 6 out of 10 when noticed.  He did have previous improvement with the Medrol Dosepak.  He did obtain recent lab work.  No new issues and he has not seen his primary care physician since last exam.    No Known Allergies    Current Outpatient Medications on File Prior to Visit   Medication Sig Dispense Refill   • clotrimazole-betamethasone (LOTRISONE) 1-0.05 % cream Apply  topically to the appropriate area as directed 2 (Two) Times a Day. Apply to affected area twice daily 45 g 1   • colchicine 0.6 MG tablet Take 0.6 mg by mouth 2 (Two) Times a Day.     • lisinopril-hydrochlorothiazide (PRINZIDE,ZESTORETIC) 20-12.5 MG per tablet Take 2 tablets by mouth Daily.     • Multiple Vitamins-Minerals (VISIVITES) tablet VISIVITES TABS     • omeprazole (priLOSEC) 20 MG capsule Take 20 mg by mouth Daily.       No current facility-administered medications on file prior to visit.        Objective   /83   Pulse 57   Ht 182.9 cm (72\")   Wt 109 kg (241 lb)   BMI 32.69 kg/m²     General:   Appearance: appears stated age and healthy    Orientation: AAOx3    Affect: appropriate    Gait: antalgic       VASCULAR       Right Foot Vascularity   Normal vascular exam    Dorsalis pedis:  2+  Posterior tibial:  2+  Skin Temperature: warm    Edema Grading:  None and 1+  CFT:  < 3 seconds  Pedal Hair Growth:  Present      Left Foot Vascularity   Normal vascular exam    Dorsalis pedis:  2+  Posterior tibial:  2+  Skin " Temperature: warm    Edema Grading:  None and 2+  CFT:  < 3 seconds  Pedal Hair Growth:  Present      NEUROLOGIC      Right Foot Neurologic   Light touch sensation:  Normal  Hot/Cold sensation: normal    Achilles reflex:  2+      Left Foot Neurologic   Light touch sensation:  Normal  Hot/cold sensation: normal    Achilles reflex:  2+      MUSCULOSKELETAL       Right Foot Musculoskeletal   Ecchymosis:  None  Tenderness: calcaneus tenderness and achilles    Arch:  Pes planus  Hallux valgus: Yes    Hallux limitus: Yes        Left Foot Musculoskeletal   Ecchymosis:  None  Tenderness: calcaneus tenderness and subtalar joint    Tenderness comment:  Significant discomfort with palpation to the heel and lateral aspect of the ankle  Arch:  Pes planus  Hallux valgus: Yes    Hallux limitus: Yes        MUSCLE STRENGTH      Right Foot Muscle Strength   Normal strength    Foot dorsiflexion:  5  Foot plantar flexion:  5  Foot inversion:  5  Foot eversion:  5      Left Foot Muscle Strength   Normal strength    Foot dorsiflexion:  5  Foot plantar flexion:  5  Foot inversion:  5  Foot eversion:  5      DERMATOLOGIC      Right Foot Dermatologic   Skin: dry skin    Skin comment:  Eczema improved to the ankle      Left Foot Dermatologic   Skin: skin intact       Additional comments: No substantial discomfort with palpation today.  No progressive deformity or instability    Assessment/Plan   Diagnoses and all orders for this visit:    1. Acute idiopathic gout of left ankle (Primary)    2. Arthritis of both feet  -     Uric Acid; Future  -     Basic Metabolic Panel; Future    Other orders  -     methylPREDNISolone (MEDROL) 4 MG dose pack; Take as directed  Dispense: 21 tablet; Refill: 0  -     allopurinol (Zyloprim) 300 MG tablet; Take 1 tablet by mouth Daily.  Dispense: 30 tablet; Refill: 0      Patient continues to have intermittent discomfort involving the feet and ankles.  His most recent uric acid remains elevated at 9.1 mg/dL.   Fortunately, his creatinine has improved.  I continue to feel that he has chronic gout.  At this time, I would like to treat him with a Medrol Dosepak for symptom management and I have started him on allopurinol.  I did discuss the medications and diagnosis at length.  Patient is to follow-up with his primary care physician for continued management and treatment of the gout.  I would like to see him in 6 weeks for reevaluation.  He is to obtain repeat uric acid and BMP at that time.    Orders Placed This Encounter   Procedures   • Uric Acid     Standing Status:   Future     Standing Expiration Date:   10/15/2021   • Basic Metabolic Panel     Standing Status:   Future     Standing Expiration Date:   10/15/2021          Note is dictated utilizing voice recognition software. Unfortunately this leads to occasional typographical errors. I apologize in advance if the situation occurs. If questions occur please do not hesitate to call our office.

## 2020-10-29 RX ORDER — METHYLPREDNISOLONE 4 MG/1
TABLET ORAL
Qty: 21 TABLET | Refills: 0 | Status: CANCELLED | OUTPATIENT
Start: 2020-10-29

## 2020-11-20 ENCOUNTER — LAB (OUTPATIENT)
Dept: LAB | Facility: HOSPITAL | Age: 65
End: 2020-11-20

## 2020-11-20 DIAGNOSIS — M19.072 ARTHRITIS OF BOTH FEET: ICD-10-CM

## 2020-11-20 DIAGNOSIS — M19.071 ARTHRITIS OF BOTH FEET: ICD-10-CM

## 2020-11-20 LAB
ANION GAP SERPL CALCULATED.3IONS-SCNC: 4.9 MMOL/L (ref 5–15)
BUN SERPL-MCNC: 22 MG/DL (ref 8–23)
BUN/CREAT SERPL: 19.3 (ref 7–25)
CALCIUM SPEC-SCNC: 9.5 MG/DL (ref 8.6–10.5)
CHLORIDE SERPL-SCNC: 105 MMOL/L (ref 98–107)
CO2 SERPL-SCNC: 29.1 MMOL/L (ref 22–29)
CREAT SERPL-MCNC: 1.14 MG/DL (ref 0.76–1.27)
GFR SERPL CREATININE-BSD FRML MDRD: 78 ML/MIN/1.73
GLUCOSE SERPL-MCNC: 96 MG/DL (ref 65–99)
POTASSIUM SERPL-SCNC: 4.6 MMOL/L (ref 3.5–5.2)
SODIUM SERPL-SCNC: 139 MMOL/L (ref 136–145)
URATE SERPL-MCNC: 7.6 MG/DL (ref 3.4–7)

## 2020-11-20 PROCEDURE — 84550 ASSAY OF BLOOD/URIC ACID: CPT

## 2020-11-20 PROCEDURE — 80048 BASIC METABOLIC PNL TOTAL CA: CPT

## 2020-11-20 PROCEDURE — 36415 COLL VENOUS BLD VENIPUNCTURE: CPT

## 2020-11-25 ENCOUNTER — OFFICE VISIT (OUTPATIENT)
Dept: PODIATRY | Facility: CLINIC | Age: 65
End: 2020-11-25

## 2020-11-25 VITALS
HEART RATE: 67 BPM | HEIGHT: 72 IN | BODY MASS INDEX: 30.48 KG/M2 | SYSTOLIC BLOOD PRESSURE: 113 MMHG | DIASTOLIC BLOOD PRESSURE: 73 MMHG | WEIGHT: 225 LBS

## 2020-11-25 DIAGNOSIS — M19.071 ARTHRITIS OF BOTH FEET: ICD-10-CM

## 2020-11-25 DIAGNOSIS — M19.072 ARTHRITIS OF BOTH FEET: ICD-10-CM

## 2020-11-25 DIAGNOSIS — M10.072 ACUTE IDIOPATHIC GOUT OF LEFT ANKLE: Primary | ICD-10-CM

## 2020-11-25 PROCEDURE — 99213 OFFICE O/P EST LOW 20 MIN: CPT | Performed by: PODIATRIST

## 2020-11-25 NOTE — PROGRESS NOTES
"11/25/2020  Foot and Ankle Surgery - Established Patient/Follow-up  Provider: Dr. Edmundo Parra DPM  Location: [facility]    Subjective:  Jesus Main is a 65 y.o. male.     Chief Complaint   Patient presents with   • Right Ankle - Follow-up   • Left Ankle - Follow-up       HPI: Patient returns for follow-up regarding both feet.  He states that he is doing substantially better.  He has not had any significant pain or limitation.  He states that he has finished his course of allopurinol.  He did obtain recent uric acid level.  He denies any issues today.    No Known Allergies    Current Outpatient Medications on File Prior to Visit   Medication Sig Dispense Refill   • clotrimazole-betamethasone (LOTRISONE) 1-0.05 % cream Apply  topically to the appropriate area as directed 2 (Two) Times a Day. Apply to affected area twice daily 45 g 1   • colchicine 0.6 MG tablet Take 0.6 mg by mouth 2 (Two) Times a Day.     • lisinopril-hydrochlorothiazide (PRINZIDE,ZESTORETIC) 20-12.5 MG per tablet Take 2 tablets by mouth Daily.     • Multiple Vitamins-Minerals (VISIVITES) tablet VISIVITES TABS     • omeprazole (priLOSEC) 20 MG capsule Take 20 mg by mouth Daily.     • [DISCONTINUED] allopurinol (Zyloprim) 300 MG tablet Take 1 tablet by mouth Daily. 30 tablet 0   • [DISCONTINUED] methylPREDNISolone (MEDROL) 4 MG dose pack Take as directed 21 tablet 0     No current facility-administered medications on file prior to visit.        Objective   /73   Pulse 67   Ht 182.9 cm (72\")   Wt 102 kg (225 lb)   BMI 30.52 kg/m²     General:   Appearance: appears stated age and healthy    Orientation: AAOx3    Affect: appropriate    Gait: antalgic       VASCULAR       Right Foot Vascularity   Normal vascular exam    Dorsalis pedis:  2+  Posterior tibial:  2+  Skin Temperature: warm    Edema Grading:  None and 1+  CFT:  < 3 seconds  Pedal Hair Growth:  Present      Left Foot Vascularity   Normal vascular exam    Dorsalis " pedis:  2+  Posterior tibial:  2+  Skin Temperature: warm    Edema Grading:  None and 2+  CFT:  < 3 seconds  Pedal Hair Growth:  Present      NEUROLOGIC      Right Foot Neurologic   Light touch sensation:  Normal  Hot/Cold sensation: normal    Achilles reflex:  2+      Left Foot Neurologic   Light touch sensation:  Normal  Hot/cold sensation: normal    Achilles reflex:  2+      MUSCULOSKELETAL       Right Foot Musculoskeletal   Ecchymosis:  None  Tenderness: calcaneus tenderness and achilles    Arch:  Pes planus  Hallux valgus: Yes    Hallux limitus: Yes        Left Foot Musculoskeletal   Ecchymosis:  None  Tenderness: calcaneus tenderness and subtalar joint    Tenderness comment:  Significant discomfort with palpation to the heel and lateral aspect of the ankle  Arch:  Pes planus  Hallux valgus: Yes    Hallux limitus: Yes        MUSCLE STRENGTH      Right Foot Muscle Strength   Normal strength    Foot dorsiflexion:  5  Foot plantar flexion:  5  Foot inversion:  5  Foot eversion:  5      Left Foot Muscle Strength   Normal strength    Foot dorsiflexion:  5  Foot plantar flexion:  5  Foot inversion:  5  Foot eversion:  5      DERMATOLOGIC      Foot Dermatologic   Skin: No open wounds or signs of infection    Assessment/Plan   Diagnoses and all orders for this visit:    1. Acute idiopathic gout of left ankle (Primary)    2. Arthritis of both feet      Patient returns for follow-up regarding bilateral foot and ankle pain.  Symptoms have significantly improved since previous evaluation and initial treatments.  He feels that he has responded well to the allopurinol and his most recent uric acid was 7.6 mg/dL which was down from 9.1.  I do feel the patient has responded well to treatment.  I would recommend that he follow-up with his PCP for continued management recommendations.  At this time, I have no restrictions.  Patient is to follow-up with me on an as-needed basis.  He is to call with any additional questions or  concerns    No orders of the defined types were placed in this encounter.         Note is dictated utilizing voice recognition software. Unfortunately this leads to occasional typographical errors. I apologize in advance if the situation occurs. If questions occur please do not hesitate to call our office.

## 2020-12-11 ENCOUNTER — OFFICE VISIT (OUTPATIENT)
Dept: SURGERY | Facility: CLINIC | Age: 65
End: 2020-12-11

## 2020-12-11 VITALS
HEIGHT: 72 IN | WEIGHT: 223.8 LBS | OXYGEN SATURATION: 97 % | SYSTOLIC BLOOD PRESSURE: 138 MMHG | TEMPERATURE: 96.9 F | DIASTOLIC BLOOD PRESSURE: 86 MMHG | RESPIRATION RATE: 18 BRPM | BODY MASS INDEX: 30.31 KG/M2 | HEART RATE: 65 BPM

## 2020-12-11 DIAGNOSIS — K43.2 RECURRENT INCISIONAL HERNIA: Primary | ICD-10-CM

## 2020-12-11 PROCEDURE — 99213 OFFICE O/P EST LOW 20 MIN: CPT | Performed by: SURGERY

## 2020-12-11 RX ORDER — ALLOPURINOL 100 MG/1
100 TABLET ORAL DAILY
COMMUNITY
Start: 2020-11-25

## 2020-12-11 NOTE — PROGRESS NOTES
"Subjective   Jesus Main is a 65 y.o. male.   65-year-old man who was previously seen in our office for evaluation of a soft minimally symptomatic supraumbilical incisional hernia.  He says this was repaired in the past about 10 years ago since then he had a prostate surgery and thinks his incision was in the same region.  For about 1 year he has noticed some supraumbilical bulging.  He says it used to stick out more but he fell when he had a bad flareup of gout and he thinks that the bulge is much less than it was previously.  He denies any nausea vomiting or change in his bowel function.  He has minimal symptoms associated with this hernia.  He says there is never a time when he cannot push the soft bump back in.    Objective   /86 (BP Location: Right arm, Patient Position: Sitting, Cuff Size: Adult)   Pulse 65   Temp 96.9 °F (36.1 °C) (Temporal)   Resp 18   Ht 182.9 cm (72\")   Wt 102 kg (223 lb 12.8 oz)   SpO2 97%   BMI 30.35 kg/m²   Physical Exam  Constitutional:       Appearance: Normal appearance.   HENT:      Head: Normocephalic and atraumatic.   Cardiovascular:      Rate and Rhythm: Normal rate.   Pulmonary:      Effort: Pulmonary effort is normal.   Abdominal:      Comments: Soft nondistended and the periumbilical region there is a an incision from previous surgery.  There is a bulge that seems to be about 5 to 6 cm across.  The fascial edges are blunted and not sharp.  He has no tenderness to palpation no overlying skin changes.   Neurological:      Mental Status: He is alert.         Assessment/Plan   Diagnoses and all orders for this visit:    1. Recurrent incisional hernia (Primary)    Asymptomatic versus minimally symptomatic supraumbilical incisional hernia.  Very broad easily reducible.  The fascial edges are difficult to define I suspect that this is mostly a laxity.  I talked him about the anticipated plan for the operation including an open incisional hernia repair with mesh.  The " area of the hernia measured 7 cm across on the CT scan so this likely may need component separation to close.  I talked about the anticipated recovery in the hospital of about 3 days and the expectations about a 6-week no lifting restriction after surgery.  Since his hernia is minimally symptomatic he does not want to have this fixed if it does not have to be fixed.  I talked about the natural history of hernias that they likely do get bigger with time they can be hard to fix when they are larger we talked about the warning signs for incarceration or strangulation of bowel and when to seek immediate medical attention.  After this discussion he is very pleased that he does not have to have surgery he wants to hold off and has been to come back in 6 months for a hernia check.    Carlos Hickman MD  12/11/2020  10:29 AM EST    This note was created using Dragon Voice Recognition software.

## 2021-05-24 ENCOUNTER — OFFICE VISIT (OUTPATIENT)
Dept: PODIATRY | Facility: CLINIC | Age: 66
End: 2021-05-24

## 2021-05-24 VITALS — WEIGHT: 226 LBS | HEIGHT: 72 IN | BODY MASS INDEX: 30.61 KG/M2

## 2021-05-24 DIAGNOSIS — M10.9 GOUTY ARTHRITIS OF BOTH FEET: Primary | ICD-10-CM

## 2021-05-24 DIAGNOSIS — M19.072 ARTHRITIS OF BOTH FEET: ICD-10-CM

## 2021-05-24 DIAGNOSIS — M19.071 ARTHRITIS OF BOTH FEET: ICD-10-CM

## 2021-05-24 PROCEDURE — 99213 OFFICE O/P EST LOW 20 MIN: CPT | Performed by: PODIATRIST

## 2021-05-24 NOTE — PROGRESS NOTES
"05/24/2021  Foot and Ankle Surgery - Established Patient/Follow-up  Provider: Dr. Edmundo Parra DPM  Location: [facility]    Subjective:  Jesus Main is a 66 y.o. male.     Chief Complaint   Patient presents with   • Left Ankle - Follow-up, Gout   • Right Ankle - Follow-up, Gout   • Left Foot - Follow-up   • Right Foot - Follow-up       HPI: Patient returns for follow-up regarding his feet.  He states that he continues to have mild discomfort at times and intermittent swelling involving both feet.  He has not had any significant flare of pain.  He has been taking the allopurinol on a daily basis and keeping up with PCP appointments.  No other issues at this time.    No Known Allergies    Current Outpatient Medications on File Prior to Visit   Medication Sig Dispense Refill   • allopurinol (ZYLOPRIM) 100 MG tablet Take 100 mg by mouth Daily.     • colchicine 0.6 MG tablet Take 0.6 mg by mouth 2 (Two) Times a Day.     • lisinopril-hydrochlorothiazide (PRINZIDE,ZESTORETIC) 20-12.5 MG per tablet Take 2 tablets by mouth Daily.     • Multiple Vitamins-Minerals (VISIVITES) tablet VISIVITES TABS     • omeprazole (priLOSEC) 20 MG capsule Take 20 mg by mouth Daily.     • clotrimazole-betamethasone (LOTRISONE) 1-0.05 % cream Apply  topically to the appropriate area as directed 2 (Two) Times a Day. Apply to affected area twice daily 45 g 1     No current facility-administered medications on file prior to visit.       Objective   Ht 182.9 cm (72\")   Wt 103 kg (226 lb)   BMI 30.65 kg/m²     General:   Appearance: appears stated age and healthy    Orientation: AAOx3    Affect: appropriate    Gait: antalgic       VASCULAR       Right Foot Vascularity   Normal vascular exam    Dorsalis pedis:  2+  Posterior tibial:  2+  Skin Temperature: warm    Edema Grading:  None and 1+  CFT:  < 3 seconds  Pedal Hair Growth:  Present      Left Foot Vascularity   Normal vascular exam    Dorsalis pedis:  2+  Posterior " tibial:  2+  Skin Temperature: warm    Edema Grading:  None and 2+  CFT:  < 3 seconds  Pedal Hair Growth:  Present      NEUROLOGIC      Right Foot Neurologic   Light touch sensation:  Normal  Hot/Cold sensation: normal    Achilles reflex:  2+      Left Foot Neurologic   Light touch sensation:  Normal  Hot/cold sensation: normal    Achilles reflex:  2+      MUSCULOSKELETAL       Right Foot Musculoskeletal   Ecchymosis:  None  Tenderness: No prominent discomfort with palpation  Arch:  Pes planus  Hallux valgus: Yes    Hallux limitus: Yes        Left Foot Musculoskeletal   Ecchymosis:  None  Tenderness: No significant discomfort with palpation on exam today  Arch:  Pes planus  Hallux valgus: Yes    Hallux limitus: Yes    Mild prominence noted to the fifth metatarsal base and head      MUSCLE STRENGTH      Right Foot Muscle Strength   Normal strength    Foot dorsiflexion:  5  Foot plantar flexion:  5  Foot inversion:  5  Foot eversion:  5      Left Foot Muscle Strength   Normal strength    Foot dorsiflexion:  5  Foot plantar flexion:  5  Foot inversion:  5  Foot eversion:  5      DERMATOLOGIC      Foot Dermatologic   Skin: No open wounds or signs of infection    Assessment/Plan   Diagnoses and all orders for this visit:    1. Acute idiopathic gout of left ankle (Primary)    2. Arthritis of both feet      Patient returns for follow-up involving both feet.  He continues to have mild swelling involving various joints but denies any prominent pain or limitation.  I do feel that his issues remain consistent with gouty arthritis.  He has no evidence of kathleen inflammation at this time.  He is not symptomatic.  I have recommended that he continue his allopurinol therapy and routine follow-ups with his PCP.  We did discuss low purine diet.  I have asked that he monitor his feet closely and call with any additional issues or concerns.  We did discuss proper use of OTC anti-inflammatories if needed.  I have asked that he keep his  feet supported and I would like to see him on an as-needed basis at this time.    No orders of the defined types were placed in this encounter.         Note is dictated utilizing voice recognition software. Unfortunately this leads to occasional typographical errors. I apologize in advance if the situation occurs. If questions occur please do not hesitate to call our office.

## 2021-06-10 ENCOUNTER — OFFICE VISIT (OUTPATIENT)
Dept: SURGERY | Facility: CLINIC | Age: 66
End: 2021-06-10

## 2021-06-10 VITALS
RESPIRATION RATE: 18 BRPM | HEART RATE: 82 BPM | DIASTOLIC BLOOD PRESSURE: 82 MMHG | SYSTOLIC BLOOD PRESSURE: 125 MMHG | OXYGEN SATURATION: 98 % | WEIGHT: 214.8 LBS | BODY MASS INDEX: 29.09 KG/M2 | TEMPERATURE: 96.8 F | HEIGHT: 72 IN

## 2021-06-10 DIAGNOSIS — K43.2 RECURRENT INCISIONAL HERNIA: Primary | ICD-10-CM

## 2021-06-10 PROCEDURE — 99213 OFFICE O/P EST LOW 20 MIN: CPT | Performed by: SURGERY

## 2021-06-10 NOTE — PROGRESS NOTES
"Subjective   Jesus Main is a 66 y.o. male.   66-year-old gentleman who is here for 6-month follow-up after I met him in December of last year to review a recurrent ventral hernia.  Over the last 6 months he says he has had very little issue.  He says that his hernia has not been bothering him hardly at all.  He wears an abdominal binder he does not have any severe nausea vomiting change in his bowel function.  He has very minimal pain at his hernia site.  He does not want to have this fixed if it does not have to be fixed.  Objective   /82 (BP Location: Left arm, Patient Position: Sitting, Cuff Size: Adult)   Pulse 82   Temp 96.8 °F (36 °C) (Infrared)   Resp 18   Ht 182.9 cm (72\")   Wt 97.4 kg (214 lb 12.8 oz)   SpO2 98%   BMI 29.13 kg/m²   Physical Exam  Constitutional:       Appearance: Normal appearance.   HENT:      Head: Normocephalic and atraumatic.   Eyes:      General: No scleral icterus.     Conjunctiva/sclera: Conjunctivae normal.   Cardiovascular:      Rate and Rhythm: Normal rate.      Pulses: Normal pulses.      Heart sounds: No murmur heard.     Abdominal:      Comments: Abdomen is soft nondistended nontender to palpation his hernia is soft and on my exam today is a bit easier to determine the edges of the hernia itself and is most likely 5 or so centimeters across.  It is not causing him any discomfort to press in this location.   Neurological:      General: No focal deficit present.      Mental Status: He is alert. Mental status is at baseline.   Psychiatric:         Mood and Affect: Mood normal.         Behavior: Behavior normal.         Assessment/Plan   Diagnoses and all orders for this visit:    1. Recurrent incisional hernia (Primary)    Recurrent incisional hernia with minimal symptoms.  At present we will continue to hold off on surgical intervention as this is a low risk for incarceration or strangulation.  We revisited the warning signs for incarceration or strangulation " for when he would need to seek immediate medical attention.  He understands those.  We will have him take one of my cards and call me down the road if he ever would like to have this repaired.    Carlos Hickman MD  6/10/2021  1:15 PM EDT    This note was created using Dragon Voice Recognition software.

## 2021-09-18 ENCOUNTER — TRANSCRIBE ORDERS (OUTPATIENT)
Dept: ADMINISTRATIVE | Facility: HOSPITAL | Age: 66
End: 2021-09-18

## 2021-09-18 ENCOUNTER — LAB (OUTPATIENT)
Dept: LAB | Facility: HOSPITAL | Age: 66
End: 2021-09-18

## 2021-09-18 DIAGNOSIS — M1A.9XX0 CHRONIC GOUT WITHOUT TOPHUS, UNSPECIFIED CAUSE, UNSPECIFIED SITE: ICD-10-CM

## 2021-09-18 DIAGNOSIS — Z13.1 SCREENING FOR DIABETES MELLITUS (DM): ICD-10-CM

## 2021-09-18 DIAGNOSIS — E78.2 MIXED HYPERLIPIDEMIA: ICD-10-CM

## 2021-09-18 DIAGNOSIS — I10 ESSENTIAL HYPERTENSION, MALIGNANT: ICD-10-CM

## 2021-09-18 DIAGNOSIS — I10 ESSENTIAL HYPERTENSION, MALIGNANT: Primary | ICD-10-CM

## 2021-09-18 LAB
ALBUMIN SERPL-MCNC: 4.1 G/DL (ref 3.5–5.2)
ALBUMIN/GLOB SERPL: 1.6 G/DL
ALP SERPL-CCNC: 73 U/L (ref 39–117)
ALT SERPL W P-5'-P-CCNC: 20 U/L (ref 1–41)
ANION GAP SERPL CALCULATED.3IONS-SCNC: 8.5 MMOL/L (ref 5–15)
AST SERPL-CCNC: 21 U/L (ref 1–40)
BASOPHILS # BLD AUTO: 0.05 10*3/MM3 (ref 0–0.2)
BASOPHILS NFR BLD AUTO: 0.8 % (ref 0–1.5)
BILIRUB SERPL-MCNC: 0.3 MG/DL (ref 0–1.2)
BUN SERPL-MCNC: 18 MG/DL (ref 8–23)
BUN/CREAT SERPL: 15.7 (ref 7–25)
CALCIUM SPEC-SCNC: 9.7 MG/DL (ref 8.6–10.5)
CHLORIDE SERPL-SCNC: 106 MMOL/L (ref 98–107)
CHOLEST SERPL-MCNC: 155 MG/DL (ref 0–200)
CO2 SERPL-SCNC: 28.5 MMOL/L (ref 22–29)
CREAT SERPL-MCNC: 1.15 MG/DL (ref 0.76–1.27)
DEPRECATED RDW RBC AUTO: 43.1 FL (ref 37–54)
EOSINOPHIL # BLD AUTO: 0.23 10*3/MM3 (ref 0–0.4)
EOSINOPHIL NFR BLD AUTO: 3.7 % (ref 0.3–6.2)
ERYTHROCYTE [DISTWIDTH] IN BLOOD BY AUTOMATED COUNT: 12.5 % (ref 12.3–15.4)
GFR SERPL CREATININE-BSD FRML MDRD: 77 ML/MIN/1.73
GLOBULIN UR ELPH-MCNC: 2.6 GM/DL
GLUCOSE SERPL-MCNC: 84 MG/DL (ref 65–99)
HCT VFR BLD AUTO: 39.9 % (ref 37.5–51)
HDLC SERPL-MCNC: 33 MG/DL (ref 40–60)
HGB BLD-MCNC: 13.3 G/DL (ref 13–17.7)
IMM GRANULOCYTES # BLD AUTO: 0.03 10*3/MM3 (ref 0–0.05)
IMM GRANULOCYTES NFR BLD AUTO: 0.5 % (ref 0–0.5)
LDLC SERPL CALC-MCNC: 112 MG/DL (ref 0–100)
LDLC/HDLC SERPL: 3.42 {RATIO}
LYMPHOCYTES # BLD AUTO: 1.14 10*3/MM3 (ref 0.7–3.1)
LYMPHOCYTES NFR BLD AUTO: 18.2 % (ref 19.6–45.3)
MCH RBC QN AUTO: 31.7 PG (ref 26.6–33)
MCHC RBC AUTO-ENTMCNC: 33.3 G/DL (ref 31.5–35.7)
MCV RBC AUTO: 95.2 FL (ref 79–97)
MONOCYTES # BLD AUTO: 0.75 10*3/MM3 (ref 0.1–0.9)
MONOCYTES NFR BLD AUTO: 11.9 % (ref 5–12)
NEUTROPHILS NFR BLD AUTO: 4.08 10*3/MM3 (ref 1.7–7)
NEUTROPHILS NFR BLD AUTO: 64.9 % (ref 42.7–76)
NRBC BLD AUTO-RTO: 0 /100 WBC (ref 0–0.2)
PLATELET # BLD AUTO: 242 10*3/MM3 (ref 140–450)
PMV BLD AUTO: 11.7 FL (ref 6–12)
POTASSIUM SERPL-SCNC: 4.6 MMOL/L (ref 3.5–5.2)
PROT SERPL-MCNC: 6.7 G/DL (ref 6–8.5)
RBC # BLD AUTO: 4.19 10*6/MM3 (ref 4.14–5.8)
SODIUM SERPL-SCNC: 143 MMOL/L (ref 136–145)
T4 FREE SERPL-MCNC: 1.02 NG/DL (ref 0.93–1.7)
TRIGL SERPL-MCNC: 45 MG/DL (ref 0–150)
TSH SERPL DL<=0.05 MIU/L-ACNC: 1.29 UIU/ML (ref 0.27–4.2)
URATE SERPL-MCNC: 7.4 MG/DL (ref 3.4–7)
VLDLC SERPL-MCNC: 10 MG/DL (ref 5–40)
WBC # BLD AUTO: 6.28 10*3/MM3 (ref 3.4–10.8)

## 2021-09-18 PROCEDURE — 84439 ASSAY OF FREE THYROXINE: CPT

## 2021-09-18 PROCEDURE — 84443 ASSAY THYROID STIM HORMONE: CPT

## 2021-09-18 PROCEDURE — 83036 HEMOGLOBIN GLYCOSYLATED A1C: CPT

## 2021-09-18 PROCEDURE — 85025 COMPLETE CBC W/AUTO DIFF WBC: CPT

## 2021-09-18 PROCEDURE — 36415 COLL VENOUS BLD VENIPUNCTURE: CPT

## 2021-09-18 PROCEDURE — 84550 ASSAY OF BLOOD/URIC ACID: CPT

## 2021-09-18 PROCEDURE — 80061 LIPID PANEL: CPT

## 2021-09-18 PROCEDURE — 80053 COMPREHEN METABOLIC PANEL: CPT

## 2021-09-20 LAB — HBA1C MFR BLD: 5.9 % (ref 3.5–5.6)

## 2021-09-25 ENCOUNTER — HOSPITAL ENCOUNTER (OUTPATIENT)
Dept: GENERAL RADIOLOGY | Facility: HOSPITAL | Age: 66
Discharge: HOME | End: 2021-09-25
Admitting: PHYSICIAN ASSISTANT

## 2021-09-25 ENCOUNTER — TRANSCRIBE ORDERS (OUTPATIENT)
Dept: ADMINISTRATIVE | Facility: HOSPITAL | Age: 66
End: 2021-09-25

## 2021-09-25 DIAGNOSIS — M25.562 ARTHRALGIA OF LEFT KNEE: ICD-10-CM

## 2021-09-25 DIAGNOSIS — M25.562 ARTHRALGIA OF LEFT KNEE: Primary | ICD-10-CM

## 2021-09-25 PROCEDURE — 73562 X-RAY EXAM OF KNEE 3: CPT

## 2022-01-01 ENCOUNTER — TRANSCRIBE ORDERS (OUTPATIENT)
Dept: ADMINISTRATIVE | Facility: HOSPITAL | Age: 67
End: 2022-01-01

## 2022-01-01 ENCOUNTER — APPOINTMENT (OUTPATIENT)
Dept: CARDIOLOGY | Facility: HOSPITAL | Age: 67
End: 2022-01-01

## 2022-01-01 ENCOUNTER — APPOINTMENT (OUTPATIENT)
Dept: CT IMAGING | Facility: HOSPITAL | Age: 67
End: 2022-01-01

## 2022-01-01 ENCOUNTER — HOSPITAL ENCOUNTER (OUTPATIENT)
Dept: PET IMAGING | Facility: HOSPITAL | Age: 67
End: 2022-01-01

## 2022-01-01 ENCOUNTER — APPOINTMENT (OUTPATIENT)
Dept: OTHER | Facility: HOSPITAL | Age: 67
End: 2022-01-01

## 2022-01-01 ENCOUNTER — LAB (OUTPATIENT)
Dept: LAB | Facility: HOSPITAL | Age: 67
End: 2022-01-01

## 2022-01-01 ENCOUNTER — APPOINTMENT (OUTPATIENT)
Dept: PET IMAGING | Facility: HOSPITAL | Age: 67
End: 2022-01-01

## 2022-01-01 ENCOUNTER — OFFICE VISIT (OUTPATIENT)
Dept: PODIATRY | Facility: CLINIC | Age: 67
End: 2022-01-01

## 2022-01-01 ENCOUNTER — HOSPITAL ENCOUNTER (OUTPATIENT)
Dept: PET IMAGING | Facility: HOSPITAL | Age: 67
Discharge: HOME | End: 2022-09-21

## 2022-01-01 ENCOUNTER — APPOINTMENT (OUTPATIENT)
Dept: GENERAL RADIOLOGY | Facility: HOSPITAL | Age: 67
End: 2022-01-01

## 2022-01-01 ENCOUNTER — HOSPITAL ENCOUNTER (OUTPATIENT)
Facility: HOSPITAL | Age: 67
Setting detail: OBSERVATION
End: 2022-10-09
Attending: EMERGENCY MEDICINE | Admitting: EMERGENCY MEDICINE

## 2022-01-01 ENCOUNTER — HOSPITAL ENCOUNTER (OUTPATIENT)
Dept: PET IMAGING | Facility: HOSPITAL | Age: 67
Discharge: HOME | End: 2022-05-10

## 2022-01-01 VITALS — WEIGHT: 214 LBS | BODY MASS INDEX: 28.99 KG/M2 | OXYGEN SATURATION: 96 % | HEIGHT: 72 IN | HEART RATE: 70 BPM

## 2022-01-01 VITALS
HEIGHT: 72 IN | RESPIRATION RATE: 16 BRPM | OXYGEN SATURATION: 97 % | HEART RATE: 105 BPM | DIASTOLIC BLOOD PRESSURE: 100 MMHG | SYSTOLIC BLOOD PRESSURE: 139 MMHG | TEMPERATURE: 97.9 F | BODY MASS INDEX: 33.53 KG/M2 | WEIGHT: 247.58 LBS

## 2022-01-01 DIAGNOSIS — C61 MALIGNANT NEOPLASM OF PROSTATE: Primary | ICD-10-CM

## 2022-01-01 DIAGNOSIS — K43.2 RECURRENT INCISIONAL HERNIA: ICD-10-CM

## 2022-01-01 DIAGNOSIS — C61 PROSTATE CANCER: ICD-10-CM

## 2022-01-01 DIAGNOSIS — M79.672 LEFT FOOT PAIN: Primary | ICD-10-CM

## 2022-01-01 DIAGNOSIS — R97.20 RISING PSA LEVEL: ICD-10-CM

## 2022-01-01 DIAGNOSIS — C61 PROSTATE CANCER: Primary | ICD-10-CM

## 2022-01-01 DIAGNOSIS — M20.22 HALLUX RIGIDUS OF LEFT FOOT: ICD-10-CM

## 2022-01-01 DIAGNOSIS — C61 MALIGNANT NEOPLASM OF PROSTATE: ICD-10-CM

## 2022-01-01 DIAGNOSIS — M19.071 ARTHRITIS OF BOTH FEET: ICD-10-CM

## 2022-01-01 DIAGNOSIS — M67.472 GANGLION CYST OF LEFT FOOT: ICD-10-CM

## 2022-01-01 DIAGNOSIS — M19.072 ARTHRITIS OF BOTH FEET: ICD-10-CM

## 2022-01-01 DIAGNOSIS — R60.0 EDEMA OF RIGHT UPPER EXTREMITY: Primary | ICD-10-CM

## 2022-01-01 DIAGNOSIS — M25.571 RIGHT ANKLE PAIN, UNSPECIFIED CHRONICITY: ICD-10-CM

## 2022-01-01 LAB
A PHAGOCYTOPH IGG TITR SER IF: NEGATIVE {TITER}
A PHAGOCYTOPH IGM TITR SER IF: NEGATIVE {TITER}
ALBUMIN SERPL-MCNC: 4.1 G/DL (ref 3.5–5.2)
ALBUMIN/GLOB SERPL: 1.7 G/DL
ALP SERPL-CCNC: 79 U/L (ref 39–117)
ALT SERPL W P-5'-P-CCNC: 14 U/L (ref 1–41)
ANA SER QL: NEGATIVE
ANION GAP SERPL CALCULATED.3IONS-SCNC: 10 MMOL/L (ref 5–15)
ANION GAP SERPL CALCULATED.3IONS-SCNC: 11 MMOL/L (ref 5–15)
ANION GAP SERPL CALCULATED.3IONS-SCNC: 12 MMOL/L (ref 5–15)
ANION GAP SERPL CALCULATED.3IONS-SCNC: 12 MMOL/L (ref 5–15)
ANION GAP SERPL CALCULATED.3IONS-SCNC: 14 MMOL/L (ref 5–15)
ANION GAP SERPL CALCULATED.3IONS-SCNC: 8 MMOL/L (ref 5–15)
ANION GAP SERPL CALCULATED.3IONS-SCNC: 8 MMOL/L (ref 5–15)
ANION GAP SERPL CALCULATED.3IONS-SCNC: 9 MMOL/L (ref 5–15)
ARTERIAL PATENCY WRIST A: POSITIVE
AST SERPL-CCNC: 21 U/L (ref 1–40)
ATMOSPHERIC PRESS: ABNORMAL MM[HG]
B BURGDOR IGG SER QL: NEGATIVE
BACTERIA SPEC AEROBE CULT: NORMAL
BACTERIA SPEC AEROBE CULT: NORMAL
BASE EXCESS BLDA CALC-SCNC: -2.8 MMOL/L (ref 0–3)
BASOPHILS # BLD AUTO: 0 10*3/MM3 (ref 0–0.2)
BASOPHILS # BLD AUTO: 0.04 10*3/MM3 (ref 0–0.2)
BASOPHILS NFR BLD AUTO: 0.1 % (ref 0–1.5)
BASOPHILS NFR BLD AUTO: 0.2 % (ref 0–1.5)
BASOPHILS NFR BLD AUTO: 0.2 % (ref 0–1.5)
BASOPHILS NFR BLD AUTO: 0.8 % (ref 0–1.5)
BDY SITE: ABNORMAL
BH CV LOW VAS LEFT COMMON FEMORAL SPONT: 1
BH CV LOW VAS LEFT DISTAL FEMORAL SPONT: 1
BH CV LOW VAS LEFT GASTRONEMIUS VESSEL: 1
BH CV LOW VAS LEFT MID FEMORAL SPONT: 1
BH CV LOW VAS LEFT POPLITEAL SPONT: 1
BH CV LOW VAS LEFT POSTERIOR TIBIAL VESSEL: 1
BH CV LOW VAS LEFT PROXIMAL FEMORAL SPONT: 1
BH CV LOW VAS LEFT SOLEAL VESSEL: 1
BH CV LOWER VASCULAR LEFT COMMON FEMORAL AUGMENT: NORMAL
BH CV LOWER VASCULAR LEFT COMMON FEMORAL COMPETENT: NORMAL
BH CV LOWER VASCULAR LEFT COMMON FEMORAL COMPRESS: NORMAL
BH CV LOWER VASCULAR LEFT COMMON FEMORAL PHASIC: NORMAL
BH CV LOWER VASCULAR LEFT COMMON FEMORAL SPONT: NORMAL
BH CV LOWER VASCULAR LEFT COMMON FEMORAL THROMBUS: NORMAL
BH CV LOWER VASCULAR LEFT DISTAL FEMORAL AUGMENT: NORMAL
BH CV LOWER VASCULAR LEFT DISTAL FEMORAL COMPETENT: NORMAL
BH CV LOWER VASCULAR LEFT DISTAL FEMORAL COMPRESS: NORMAL
BH CV LOWER VASCULAR LEFT DISTAL FEMORAL PHASIC: NORMAL
BH CV LOWER VASCULAR LEFT DISTAL FEMORAL SPONT: NORMAL
BH CV LOWER VASCULAR LEFT DISTAL FEMORAL THROMBUS: NORMAL
BH CV LOWER VASCULAR LEFT GASTRONEMIUS COMPRESS: NORMAL
BH CV LOWER VASCULAR LEFT GASTRONEMIUS THROMBUS: NORMAL
BH CV LOWER VASCULAR LEFT GREATER SAPH AK SPONT: NORMAL
BH CV LOWER VASCULAR LEFT GREATER SAPH BK COMPRESS: NORMAL
BH CV LOWER VASCULAR LEFT LESSER SAPH COMPRESS: NORMAL
BH CV LOWER VASCULAR LEFT MID FEMORAL AUGMENT: NORMAL
BH CV LOWER VASCULAR LEFT MID FEMORAL COMPETENT: NORMAL
BH CV LOWER VASCULAR LEFT MID FEMORAL COMPRESS: NORMAL
BH CV LOWER VASCULAR LEFT MID FEMORAL PHASIC: NORMAL
BH CV LOWER VASCULAR LEFT MID FEMORAL SPONT: NORMAL
BH CV LOWER VASCULAR LEFT MID FEMORAL THROMBUS: NORMAL
BH CV LOWER VASCULAR LEFT PERONEAL COMPRESS: NORMAL
BH CV LOWER VASCULAR LEFT PERONEAL SPONT: NORMAL
BH CV LOWER VASCULAR LEFT POPLITEAL AUGMENT: NORMAL
BH CV LOWER VASCULAR LEFT POPLITEAL COMPETENT: NORMAL
BH CV LOWER VASCULAR LEFT POPLITEAL COMPRESS: NORMAL
BH CV LOWER VASCULAR LEFT POPLITEAL PHASIC: NORMAL
BH CV LOWER VASCULAR LEFT POPLITEAL SPONT: NORMAL
BH CV LOWER VASCULAR LEFT POPLITEAL THROMBUS: NORMAL
BH CV LOWER VASCULAR LEFT POSTERIOR TIBIAL COMPRESS: NORMAL
BH CV LOWER VASCULAR LEFT POSTERIOR TIBIAL THROMBUS: NORMAL
BH CV LOWER VASCULAR LEFT PROXIMAL FEMORAL AUGMENT: NORMAL
BH CV LOWER VASCULAR LEFT PROXIMAL FEMORAL COMPETENT: NORMAL
BH CV LOWER VASCULAR LEFT PROXIMAL FEMORAL COMPRESS: NORMAL
BH CV LOWER VASCULAR LEFT PROXIMAL FEMORAL PHASIC: NORMAL
BH CV LOWER VASCULAR LEFT PROXIMAL FEMORAL SPONT: NORMAL
BH CV LOWER VASCULAR LEFT PROXIMAL FEMORAL THROMBUS: NORMAL
BH CV LOWER VASCULAR LEFT SAPHENOFEMORAL JUNCTION SPONT: NORMAL
BH CV LOWER VASCULAR LEFT SOLEAL COMPRESS: NORMAL
BH CV LOWER VASCULAR LEFT SOLEAL THROMBUS: NORMAL
BH CV LOWER VASCULAR RIGHT COMMON FEMORAL AUGMENT: NORMAL
BH CV LOWER VASCULAR RIGHT COMMON FEMORAL COMPETENT: NORMAL
BH CV LOWER VASCULAR RIGHT COMMON FEMORAL COMPRESS: NORMAL
BH CV LOWER VASCULAR RIGHT COMMON FEMORAL PHASIC: NORMAL
BH CV LOWER VASCULAR RIGHT COMMON FEMORAL SPONT: NORMAL
BH CV UPPER VENOUS LEFT INTERNAL JUGULAR AUGMENT: NORMAL
BH CV UPPER VENOUS LEFT INTERNAL JUGULAR COMPRESS: NORMAL
BH CV UPPER VENOUS LEFT INTERNAL JUGULAR PHASIC: NORMAL
BH CV UPPER VENOUS LEFT INTERNAL JUGULAR SPONT: NORMAL
BH CV UPPER VENOUS LEFT SUBCLAVIAN AUGMENT: NORMAL
BH CV UPPER VENOUS LEFT SUBCLAVIAN COMPRESS: NORMAL
BH CV UPPER VENOUS LEFT SUBCLAVIAN PHASIC: NORMAL
BH CV UPPER VENOUS LEFT SUBCLAVIAN SPONT: NORMAL
BH CV UPPER VENOUS RIGHT AXILLARY AUGMENT: NORMAL
BH CV UPPER VENOUS RIGHT AXILLARY COMPRESS: NORMAL
BH CV UPPER VENOUS RIGHT AXILLARY PHASIC: NORMAL
BH CV UPPER VENOUS RIGHT AXILLARY SPONT: NORMAL
BH CV UPPER VENOUS RIGHT BASILIC FOREARM COMPRESS: NORMAL
BH CV UPPER VENOUS RIGHT BASILIC UPPER COMPRESS: NORMAL
BH CV UPPER VENOUS RIGHT BRACHIAL COMPRESS: NORMAL
BH CV UPPER VENOUS RIGHT CEPHALIC FOREARM COMPRESS: NORMAL
BH CV UPPER VENOUS RIGHT INTERNAL JUGULAR AUGMENT: NORMAL
BH CV UPPER VENOUS RIGHT INTERNAL JUGULAR COMPRESS: NORMAL
BH CV UPPER VENOUS RIGHT INTERNAL JUGULAR PHASIC: NORMAL
BH CV UPPER VENOUS RIGHT INTERNAL JUGULAR SPONT: NORMAL
BH CV UPPER VENOUS RIGHT RADIAL COMPRESS: NORMAL
BH CV UPPER VENOUS RIGHT SUBCLAVIAN AUGMENT: NORMAL
BH CV UPPER VENOUS RIGHT SUBCLAVIAN COMPRESS: NORMAL
BH CV UPPER VENOUS RIGHT SUBCLAVIAN PHASIC: NORMAL
BH CV UPPER VENOUS RIGHT SUBCLAVIAN SPONT: NORMAL
BH CV UPPER VENOUS RIGHT ULNAR COMPRESS: NORMAL
BH CV VAS PRELIMINARY FINDINGS SCRIPTING: 1
BILIRUB SERPL-MCNC: 0.4 MG/DL (ref 0–1.2)
BRUCELLA IGG SER QL IA: NEGATIVE
BRUCELLA IGM SER QL IA: NEGATIVE
BUN SERPL-MCNC: 17 MG/DL (ref 8–23)
BUN SERPL-MCNC: 18 MG/DL (ref 8–23)
BUN SERPL-MCNC: 23 MG/DL (ref 8–23)
BUN SERPL-MCNC: 28 MG/DL (ref 8–23)
BUN SERPL-MCNC: 29 MG/DL (ref 8–23)
BUN SERPL-MCNC: 33 MG/DL (ref 8–23)
BUN/CREAT SERPL: 14.2 (ref 7–25)
BUN/CREAT SERPL: 14.9 (ref 7–25)
BUN/CREAT SERPL: 20.5 (ref 7–25)
BUN/CREAT SERPL: 23.6 (ref 7–25)
BUN/CREAT SERPL: 27.2 (ref 7–25)
BUN/CREAT SERPL: 27.6 (ref 7–25)
BUN/CREAT SERPL: 28 (ref 7–25)
BUN/CREAT SERPL: 31.9 (ref 7–25)
CA-I BLDA-SCNC: 1.28 MMOL/L (ref 1.15–1.33)
CALCIUM SPEC-SCNC: 9.1 MG/DL (ref 8.6–10.5)
CALCIUM SPEC-SCNC: 9.3 MG/DL (ref 8.6–10.5)
CALCIUM SPEC-SCNC: 9.6 MG/DL (ref 8.6–10.5)
CALCIUM SPEC-SCNC: 9.7 MG/DL (ref 8.6–10.5)
CALCIUM SPEC-SCNC: 9.8 MG/DL (ref 8.6–10.5)
CALCIUM SPEC-SCNC: 9.9 MG/DL (ref 8.6–10.5)
CHLORIDE SERPL-SCNC: 100 MMOL/L (ref 98–107)
CHLORIDE SERPL-SCNC: 101 MMOL/L (ref 98–107)
CHLORIDE SERPL-SCNC: 101 MMOL/L (ref 98–107)
CHLORIDE SERPL-SCNC: 102 MMOL/L (ref 98–107)
CHLORIDE SERPL-SCNC: 97 MMOL/L (ref 98–107)
CHLORIDE SERPL-SCNC: 98 MMOL/L (ref 98–107)
CHROMATIN AB SERPL-ACNC: <10 IU/ML (ref 0–14)
CO2 BLDA-SCNC: 22.8 MMOL/L (ref 22–29)
CO2 SERPL-SCNC: 22 MMOL/L (ref 22–29)
CO2 SERPL-SCNC: 24 MMOL/L (ref 22–29)
CO2 SERPL-SCNC: 25 MMOL/L (ref 22–29)
CO2 SERPL-SCNC: 25 MMOL/L (ref 22–29)
CO2 SERPL-SCNC: 26 MMOL/L (ref 22–29)
CO2 SERPL-SCNC: 27 MMOL/L (ref 22–29)
CO2 SERPL-SCNC: 28 MMOL/L (ref 22–29)
CO2 SERPL-SCNC: 30 MMOL/L (ref 22–29)
CREAT SERPL-MCNC: 0.91 MG/DL (ref 0.76–1.27)
CREAT SERPL-MCNC: 1.03 MG/DL (ref 0.76–1.27)
CREAT SERPL-MCNC: 1.05 MG/DL (ref 0.76–1.27)
CREAT SERPL-MCNC: 1.12 MG/DL (ref 0.76–1.27)
CREAT SERPL-MCNC: 1.14 MG/DL (ref 0.76–1.27)
CREAT SERPL-MCNC: 1.18 MG/DL (ref 0.76–1.27)
CREAT SERPL-MCNC: 1.23 MG/DL (ref 0.76–1.27)
CREAT SERPL-MCNC: 1.27 MG/DL (ref 0.76–1.27)
D DIMER PPP FEU-MCNC: >35.2 MG/L (FEU) (ref 0–0.59)
D-LACTATE SERPL-SCNC: 5.3 MMOL/L (ref 0.5–2)
DEPRECATED RDW RBC AUTO: 41.5 FL (ref 37–54)
DEPRECATED RDW RBC AUTO: 44.6 FL (ref 37–54)
DEPRECATED RDW RBC AUTO: 45.1 FL (ref 37–54)
DEPRECATED RDW RBC AUTO: 45.1 FL (ref 37–54)
DEPRECATED RDW RBC AUTO: 46.4 FL (ref 37–54)
DEPRECATED RDW RBC AUTO: 47.3 FL (ref 37–54)
DEPRECATED RDW RBC AUTO: 48.1 FL (ref 37–54)
DEPRECATED RDW RBC AUTO: 48.1 FL (ref 37–54)
E CHAFFEENSIS IGG TITR SER IF: NEGATIVE {TITER}
E CHAFFEENSIS IGM TITR SER IF: NEGATIVE {TITER}
EGFRCR SERPLBLD CKD-EPI 2021: 61.2 ML/MIN/1.73
EGFRCR SERPLBLD CKD-EPI 2021: 63.6 ML/MIN/1.73
EGFRCR SERPLBLD CKD-EPI 2021: 66.8 ML/MIN/1.73
EGFRCR SERPLBLD CKD-EPI 2021: 69.6 ML/MIN/1.73
EGFRCR SERPLBLD CKD-EPI 2021: 71.1 ML/MIN/1.73
EGFRCR SERPLBLD CKD-EPI 2021: 76.8 ML/MIN/1.73
EGFRCR SERPLBLD CKD-EPI 2021: 78.6 ML/MIN/1.73
EGFRCR SERPLBLD CKD-EPI 2021: 91.2 ML/MIN/1.73
EOSINOPHIL # BLD AUTO: 0 10*3/MM3 (ref 0–0.4)
EOSINOPHIL # BLD AUTO: 0.29 10*3/MM3 (ref 0–0.4)
EOSINOPHIL NFR BLD AUTO: 0 % (ref 0.3–6.2)
EOSINOPHIL NFR BLD AUTO: 6.1 % (ref 0.3–6.2)
ERYTHROCYTE [DISTWIDTH] IN BLOOD BY AUTOMATED COUNT: 12.8 % (ref 12.3–15.4)
ERYTHROCYTE [DISTWIDTH] IN BLOOD BY AUTOMATED COUNT: 13.9 % (ref 12.3–15.4)
ERYTHROCYTE [DISTWIDTH] IN BLOOD BY AUTOMATED COUNT: 14 % (ref 12.3–15.4)
ERYTHROCYTE [DISTWIDTH] IN BLOOD BY AUTOMATED COUNT: 14.1 % (ref 12.3–15.4)
ERYTHROCYTE [DISTWIDTH] IN BLOOD BY AUTOMATED COUNT: 14.4 % (ref 12.3–15.4)
ERYTHROCYTE [DISTWIDTH] IN BLOOD BY AUTOMATED COUNT: 14.5 % (ref 12.3–15.4)
ERYTHROCYTE [DISTWIDTH] IN BLOOD BY AUTOMATED COUNT: 14.6 % (ref 12.3–15.4)
ERYTHROCYTE [DISTWIDTH] IN BLOOD BY AUTOMATED COUNT: 14.7 % (ref 12.3–15.4)
ERYTHROCYTE [SEDIMENTATION RATE] IN BLOOD: 128 MM/HR (ref 0–20)
ERYTHROCYTE [SEDIMENTATION RATE] IN BLOOD: 89 MM/HR (ref 0–20)
GLOBULIN UR ELPH-MCNC: 2.4 GM/DL
GLUCOSE BLDC GLUCOMTR-MCNC: 111 MG/DL (ref 70–105)
GLUCOSE BLDC GLUCOMTR-MCNC: 156 MG/DL (ref 70–105)
GLUCOSE BLDC GLUCOMTR-MCNC: 93 MG/DL (ref 74–100)
GLUCOSE BLDC GLUCOMTR-MCNC: 93 MG/DL (ref 74–100)
GLUCOSE SERPL-MCNC: 110 MG/DL (ref 65–99)
GLUCOSE SERPL-MCNC: 120 MG/DL (ref 65–99)
GLUCOSE SERPL-MCNC: 124 MG/DL (ref 65–99)
GLUCOSE SERPL-MCNC: 132 MG/DL (ref 65–99)
GLUCOSE SERPL-MCNC: 135 MG/DL (ref 65–99)
GLUCOSE SERPL-MCNC: 135 MG/DL (ref 65–99)
GLUCOSE SERPL-MCNC: 158 MG/DL (ref 65–99)
GLUCOSE SERPL-MCNC: 94 MG/DL (ref 65–99)
HCO3 BLDA-SCNC: 21.7 MMOL/L (ref 21–28)
HCT VFR BLD AUTO: 33.7 % (ref 37.5–51)
HCT VFR BLD AUTO: 34.7 % (ref 37.5–51)
HCT VFR BLD AUTO: 35 % (ref 37.5–51)
HCT VFR BLD AUTO: 35.4 % (ref 37.5–51)
HCT VFR BLD AUTO: 35.8 % (ref 37.5–51)
HCT VFR BLD AUTO: 36.3 % (ref 37.5–51)
HCT VFR BLD AUTO: 37.8 % (ref 37.5–51)
HCT VFR BLD AUTO: 38.2 % (ref 37.5–51)
HCT VFR BLDA CALC: 41 % (ref 38–51)
HEMODILUTION: NO
HGB BLD-MCNC: 11.1 G/DL (ref 13–17.7)
HGB BLD-MCNC: 11.3 G/DL (ref 13–17.7)
HGB BLD-MCNC: 11.5 G/DL (ref 13–17.7)
HGB BLD-MCNC: 11.5 G/DL (ref 13–17.7)
HGB BLD-MCNC: 11.6 G/DL (ref 13–17.7)
HGB BLD-MCNC: 11.6 G/DL (ref 13–17.7)
HGB BLD-MCNC: 12.3 G/DL (ref 13–17.7)
HGB BLD-MCNC: 13.3 G/DL (ref 13–17.7)
HGB BLDA-MCNC: 13.8 G/DL (ref 12–17)
IMM GRANULOCYTES # BLD AUTO: 0.02 10*3/MM3 (ref 0–0.05)
IMM GRANULOCYTES NFR BLD AUTO: 0.4 % (ref 0–0.5)
INHALED O2 CONCENTRATION: 100 %
LARGE PLATELETS: ABNORMAL
LDH SERPL-CCNC: 218 U/L (ref 135–225)
LYMPHOCYTES # BLD AUTO: 0.3 10*3/MM3 (ref 0.7–3.1)
LYMPHOCYTES # BLD AUTO: 0.4 10*3/MM3 (ref 0.7–3.1)
LYMPHOCYTES # BLD AUTO: 0.5 10*3/MM3 (ref 0.7–3.1)
LYMPHOCYTES # BLD AUTO: 1.02 10*3/MM3 (ref 0.7–3.1)
LYMPHOCYTES # BLD MANUAL: 0.53 10*3/MM3 (ref 0.7–3.1)
LYMPHOCYTES # BLD MANUAL: 1.22 10*3/MM3 (ref 0.7–3.1)
LYMPHOCYTES NFR BLD AUTO: 2.6 % (ref 19.6–45.3)
LYMPHOCYTES NFR BLD AUTO: 21.6 % (ref 19.6–45.3)
LYMPHOCYTES NFR BLD AUTO: 3 % (ref 19.6–45.3)
LYMPHOCYTES NFR BLD AUTO: 3.6 % (ref 19.6–45.3)
LYMPHOCYTES NFR BLD MANUAL: 14 % (ref 5–12)
LYMPHOCYTES NFR BLD MANUAL: 7 % (ref 5–12)
MAXIMAL PREDICTED HEART RATE: 151 BPM
MAXIMAL PREDICTED HEART RATE: 151 BPM
MCH RBC QN AUTO: 29.8 PG (ref 26.6–33)
MCH RBC QN AUTO: 29.9 PG (ref 26.6–33)
MCH RBC QN AUTO: 30 PG (ref 26.6–33)
MCH RBC QN AUTO: 30.2 PG (ref 26.6–33)
MCH RBC QN AUTO: 30.2 PG (ref 26.6–33)
MCH RBC QN AUTO: 30.3 PG (ref 26.6–33)
MCH RBC QN AUTO: 30.6 PG (ref 26.6–33)
MCH RBC QN AUTO: 31.1 PG (ref 26.6–33)
MCHC RBC AUTO-ENTMCNC: 31.9 G/DL (ref 31.5–35.7)
MCHC RBC AUTO-ENTMCNC: 32.1 G/DL (ref 31.5–35.7)
MCHC RBC AUTO-ENTMCNC: 32.1 G/DL (ref 31.5–35.7)
MCHC RBC AUTO-ENTMCNC: 32.3 G/DL (ref 31.5–35.7)
MCHC RBC AUTO-ENTMCNC: 32.6 G/DL (ref 31.5–35.7)
MCHC RBC AUTO-ENTMCNC: 32.9 G/DL (ref 31.5–35.7)
MCHC RBC AUTO-ENTMCNC: 33.5 G/DL (ref 31.5–35.7)
MCHC RBC AUTO-ENTMCNC: 35.2 G/DL (ref 31.5–35.7)
MCV RBC AUTO: 88.5 FL (ref 79–97)
MCV RBC AUTO: 90.5 FL (ref 79–97)
MCV RBC AUTO: 91.3 FL (ref 79–97)
MCV RBC AUTO: 92.8 FL (ref 79–97)
MCV RBC AUTO: 92.9 FL (ref 79–97)
MCV RBC AUTO: 93.4 FL (ref 79–97)
MCV RBC AUTO: 94 FL (ref 79–97)
MCV RBC AUTO: 94.5 FL (ref 79–97)
METAMYELOCYTES NFR BLD MANUAL: 1 % (ref 0–0)
METAMYELOCYTES NFR BLD MANUAL: 1 % (ref 0–0)
MODALITY: ABNORMAL
MONOCYTES # BLD AUTO: 0.4 10*3/MM3 (ref 0.1–0.9)
MONOCYTES # BLD AUTO: 0.59 10*3/MM3 (ref 0.1–0.9)
MONOCYTES # BLD AUTO: 0.6 10*3/MM3 (ref 0.1–0.9)
MONOCYTES # BLD AUTO: 0.9 10*3/MM3 (ref 0.1–0.9)
MONOCYTES # BLD: 0.85 10*3/MM3 (ref 0.1–0.9)
MONOCYTES # BLD: 1.86 10*3/MM3 (ref 0.1–0.9)
MONOCYTES NFR BLD AUTO: 12.5 % (ref 5–12)
MONOCYTES NFR BLD AUTO: 3.6 % (ref 5–12)
MONOCYTES NFR BLD AUTO: 4.4 % (ref 5–12)
MONOCYTES NFR BLD AUTO: 5.7 % (ref 5–12)
NEUTROPHILS # BLD AUTO: 10 10*3/MM3 (ref 1.7–7)
NEUTROPHILS # BLD AUTO: 10.77 10*3/MM3 (ref 1.7–7)
NEUTROPHILS NFR BLD AUTO: 10.7 10*3/MM3 (ref 1.7–7)
NEUTROPHILS NFR BLD AUTO: 12.1 10*3/MM3 (ref 1.7–7)
NEUTROPHILS NFR BLD AUTO: 14.3 10*3/MM3 (ref 1.7–7)
NEUTROPHILS NFR BLD AUTO: 2.76 10*3/MM3 (ref 1.7–7)
NEUTROPHILS NFR BLD AUTO: 58.6 % (ref 42.7–76)
NEUTROPHILS NFR BLD AUTO: 91.5 % (ref 42.7–76)
NEUTROPHILS NFR BLD AUTO: 91.9 % (ref 42.7–76)
NEUTROPHILS NFR BLD AUTO: 93.2 % (ref 42.7–76)
NEUTROPHILS NFR BLD MANUAL: 78 % (ref 42.7–76)
NEUTROPHILS NFR BLD MANUAL: 80 % (ref 42.7–76)
NEUTS BAND NFR BLD MANUAL: 1 % (ref 0–5)
NEUTS BAND NFR BLD MANUAL: 4 % (ref 0–5)
NRBC BLD AUTO-RTO: 0 /100 WBC (ref 0–0.2)
PCO2 BLDA: 36.3 MM HG (ref 35–48)
PH BLDA: 7.38 PH UNITS (ref 7.35–7.45)
PLAT MORPH BLD: NORMAL
PLATELET # BLD AUTO: 218 10*3/MM3 (ref 140–450)
PLATELET # BLD AUTO: 254 10*3/MM3 (ref 140–450)
PLATELET # BLD AUTO: 256 10*3/MM3 (ref 140–450)
PLATELET # BLD AUTO: 283 10*3/MM3 (ref 140–450)
PLATELET # BLD AUTO: 298 10*3/MM3 (ref 140–450)
PLATELET # BLD AUTO: 329 10*3/MM3 (ref 140–450)
PLATELET # BLD AUTO: 332 10*3/MM3 (ref 140–450)
PLATELET # BLD AUTO: 349 10*3/MM3 (ref 140–450)
PMV BLD AUTO: 12 FL (ref 6–12)
PMV BLD AUTO: 8.3 FL (ref 6–12)
PMV BLD AUTO: 8.9 FL (ref 6–12)
PMV BLD AUTO: 8.9 FL (ref 6–12)
PMV BLD AUTO: 9.1 FL (ref 6–12)
PMV BLD AUTO: 9.3 FL (ref 6–12)
PO2 BLDA: 26.3 MM HG (ref 83–108)
POTASSIUM BLDA-SCNC: 4.3 MMOL/L (ref 3.5–4.5)
POTASSIUM SERPL-SCNC: 3.9 MMOL/L (ref 3.5–5.2)
POTASSIUM SERPL-SCNC: 4.1 MMOL/L (ref 3.5–5.2)
POTASSIUM SERPL-SCNC: 4.2 MMOL/L (ref 3.5–5.2)
POTASSIUM SERPL-SCNC: 4.2 MMOL/L (ref 3.5–5.2)
POTASSIUM SERPL-SCNC: 4.6 MMOL/L (ref 3.5–5.2)
POTASSIUM SERPL-SCNC: 4.7 MMOL/L (ref 3.5–5.2)
POTASSIUM SERPL-SCNC: 4.8 MMOL/L (ref 3.5–5.2)
POTASSIUM SERPL-SCNC: 5.1 MMOL/L (ref 3.5–5.2)
PROT SERPL-MCNC: 6.5 G/DL (ref 6–8.5)
PSA SERPL-MCNC: 0.89 NG/ML (ref 0–4)
RBC # BLD AUTO: 3.72 10*6/MM3 (ref 4.14–5.8)
RBC # BLD AUTO: 3.72 10*6/MM3 (ref 4.14–5.8)
RBC # BLD AUTO: 3.77 10*6/MM3 (ref 4.14–5.8)
RBC # BLD AUTO: 3.85 10*6/MM3 (ref 4.14–5.8)
RBC # BLD AUTO: 3.85 10*6/MM3 (ref 4.14–5.8)
RBC # BLD AUTO: 3.88 10*6/MM3 (ref 4.14–5.8)
RBC # BLD AUTO: 4.06 10*6/MM3 (ref 4.14–5.8)
RBC # BLD AUTO: 4.27 10*6/MM3 (ref 4.14–5.8)
RBC MORPH BLD: NORMAL
RBC MORPH BLD: NORMAL
RICK SF IGG TITR SER IF: NORMAL {TITER}
RICK SF IGM TITR SER IF: NORMAL {TITER}
RICK TYPHUS IGG TITR SER IF: NORMAL {TITER}
RICK TYPHUS IGM TITR SER IF: NORMAL {TITER}
SAO2 % BLDCOA: 48.1 % (ref 94–98)
SCAN SLIDE: NORMAL
SCAN SLIDE: NORMAL
SODIUM BLD-SCNC: 138 MMOL/L (ref 138–146)
SODIUM SERPL-SCNC: 135 MMOL/L (ref 136–145)
SODIUM SERPL-SCNC: 136 MMOL/L (ref 136–145)
SODIUM SERPL-SCNC: 137 MMOL/L (ref 136–145)
SODIUM SERPL-SCNC: 137 MMOL/L (ref 136–145)
SODIUM SERPL-SCNC: 138 MMOL/L (ref 136–145)
SODIUM SERPL-SCNC: 141 MMOL/L (ref 136–145)
STRESS TARGET HR: 128 BPM
STRESS TARGET HR: 128 BPM
TESTOST SERPL-MCNC: 675 NG/DL (ref 193–740)
TOXIC GRANULATION: ABNORMAL
URATE SERPL-MCNC: 4.7 MG/DL (ref 3.4–7)
URATE SERPL-MCNC: 7 MG/DL (ref 3.4–7)
VARIANT LYMPHS NFR BLD MANUAL: 10 % (ref 19.6–45.3)
VARIANT LYMPHS NFR BLD MANUAL: 4 % (ref 19.6–45.3)
WBC MORPH BLD: NORMAL
WBC NRBC COR # BLD: 11.1 10*3/MM3 (ref 3.4–10.8)
WBC NRBC COR # BLD: 11.5 10*3/MM3 (ref 3.4–10.8)
WBC NRBC COR # BLD: 12.2 10*3/MM3 (ref 3.4–10.8)
WBC NRBC COR # BLD: 12.2 10*3/MM3 (ref 3.4–10.8)
WBC NRBC COR # BLD: 13.1 10*3/MM3 (ref 3.4–10.8)
WBC NRBC COR # BLD: 13.3 10*3/MM3 (ref 3.4–10.8)
WBC NRBC COR # BLD: 15.6 10*3/MM3 (ref 3.4–10.8)
WBC NRBC COR # BLD: 4.72 10*3/MM3 (ref 3.4–10.8)

## 2022-01-01 PROCEDURE — G0378 HOSPITAL OBSERVATION PER HR: HCPCS

## 2022-01-01 PROCEDURE — 25010000002 METHYLPREDNISOLONE PER 40 MG: Performed by: FAMILY MEDICINE

## 2022-01-01 PROCEDURE — 80048 BASIC METABOLIC PNL TOTAL CA: CPT

## 2022-01-01 PROCEDURE — 82962 GLUCOSE BLOOD TEST: CPT

## 2022-01-01 PROCEDURE — 96376 TX/PRO/DX INJ SAME DRUG ADON: CPT

## 2022-01-01 PROCEDURE — 85027 COMPLETE CBC AUTOMATED: CPT | Performed by: INTERNAL MEDICINE

## 2022-01-01 PROCEDURE — 85018 HEMOGLOBIN: CPT

## 2022-01-01 PROCEDURE — 84550 ASSAY OF BLOOD/URIC ACID: CPT | Performed by: INTERNAL MEDICINE

## 2022-01-01 PROCEDURE — 86757 RICKETTSIA ANTIBODY: CPT | Performed by: FAMILY MEDICINE

## 2022-01-01 PROCEDURE — 96374 THER/PROPH/DIAG INJ IV PUSH: CPT

## 2022-01-01 PROCEDURE — 80048 BASIC METABOLIC PNL TOTAL CA: CPT | Performed by: INTERNAL MEDICINE

## 2022-01-01 PROCEDURE — 97116 GAIT TRAINING THERAPY: CPT

## 2022-01-01 PROCEDURE — 85652 RBC SED RATE AUTOMATED: CPT | Performed by: FAMILY MEDICINE

## 2022-01-01 PROCEDURE — 36415 COLL VENOUS BLD VENIPUNCTURE: CPT

## 2022-01-01 PROCEDURE — 80048 BASIC METABOLIC PNL TOTAL CA: CPT | Performed by: FAMILY MEDICINE

## 2022-01-01 PROCEDURE — 31500 INSERT EMERGENCY AIRWAY: CPT | Performed by: INTERNAL MEDICINE

## 2022-01-01 PROCEDURE — 82803 BLOOD GASES ANY COMBINATION: CPT

## 2022-01-01 PROCEDURE — 63710000001 PREDNISONE PER 1 MG: Performed by: INTERNAL MEDICINE

## 2022-01-01 PROCEDURE — 25010000002 ENOXAPARIN PER 10 MG: Performed by: INTERNAL MEDICINE

## 2022-01-01 PROCEDURE — A9595 PIFLUFOLASTAT F 18 9 MCI SOLUTION PREFILLED SYRINGE: HCPCS | Performed by: UROLOGY

## 2022-01-01 PROCEDURE — 93971 EXTREMITY STUDY: CPT

## 2022-01-01 PROCEDURE — 85025 COMPLETE CBC W/AUTO DIFF WBC: CPT

## 2022-01-01 PROCEDURE — 85007 BL SMEAR W/DIFF WBC COUNT: CPT

## 2022-01-01 PROCEDURE — 86666 EHRLICHIA ANTIBODY: CPT | Performed by: FAMILY MEDICINE

## 2022-01-01 PROCEDURE — 84403 ASSAY OF TOTAL TESTOSTERONE: CPT

## 2022-01-01 PROCEDURE — 25010000002 AMIODARONE PER 30 MG: Performed by: INTERNAL MEDICINE

## 2022-01-01 PROCEDURE — 96375 TX/PRO/DX INJ NEW DRUG ADDON: CPT

## 2022-01-01 PROCEDURE — 78815 PET IMAGE W/CT SKULL-THIGH: CPT

## 2022-01-01 PROCEDURE — 25010000002 EPINEPHRINE 1 MG/10ML SOLUTION PREFILLED SYRINGE: Performed by: INTERNAL MEDICINE

## 2022-01-01 PROCEDURE — 85379 FIBRIN DEGRADATION QUANT: CPT | Performed by: INTERNAL MEDICINE

## 2022-01-01 PROCEDURE — 80051 ELECTROLYTE PANEL: CPT

## 2022-01-01 PROCEDURE — 25010000002 MAGNESIUM SULFATE PER 500 MG OF MAGNESIUM: Performed by: INTERNAL MEDICINE

## 2022-01-01 PROCEDURE — 97530 THERAPEUTIC ACTIVITIES: CPT

## 2022-01-01 PROCEDURE — 85652 RBC SED RATE AUTOMATED: CPT | Performed by: INTERNAL MEDICINE

## 2022-01-01 PROCEDURE — 99213 OFFICE O/P EST LOW 20 MIN: CPT | Performed by: PODIATRIST

## 2022-01-01 PROCEDURE — 85025 COMPLETE CBC W/AUTO DIFF WBC: CPT | Performed by: FAMILY MEDICINE

## 2022-01-01 PROCEDURE — G0103 PSA SCREENING: HCPCS

## 2022-01-01 PROCEDURE — 0 PIFLUFOLASTAT F 18 9 MCI SOLUTION PREFILLED SYRINGE: Performed by: UROLOGY

## 2022-01-01 PROCEDURE — 96372 THER/PROPH/DIAG INJ SC/IM: CPT

## 2022-01-01 PROCEDURE — 86618 LYME DISEASE ANTIBODY: CPT | Performed by: FAMILY MEDICINE

## 2022-01-01 PROCEDURE — 85025 COMPLETE CBC W/AUTO DIFF WBC: CPT | Performed by: EMERGENCY MEDICINE

## 2022-01-01 PROCEDURE — 92950 HEART/LUNG RESUSCITATION CPR: CPT

## 2022-01-01 PROCEDURE — 20550 NJX 1 TENDON SHEATH/LIGAMENT: CPT | Performed by: PODIATRIST

## 2022-01-01 PROCEDURE — 99285 EMERGENCY DEPT VISIT HI MDM: CPT

## 2022-01-01 PROCEDURE — 73080 X-RAY EXAM OF ELBOW: CPT

## 2022-01-01 PROCEDURE — 94799 UNLISTED PULMONARY SVC/PX: CPT

## 2022-01-01 PROCEDURE — 85007 BL SMEAR W/DIFF WBC COUNT: CPT | Performed by: EMERGENCY MEDICINE

## 2022-01-01 PROCEDURE — 71275 CT ANGIOGRAPHY CHEST: CPT

## 2022-01-01 PROCEDURE — 80053 COMPREHEN METABOLIC PANEL: CPT

## 2022-01-01 PROCEDURE — 0 IOPAMIDOL PER 1 ML: Performed by: INTERNAL MEDICINE

## 2022-01-01 PROCEDURE — 36415 COLL VENOUS BLD VENIPUNCTURE: CPT | Performed by: EMERGENCY MEDICINE

## 2022-01-01 PROCEDURE — 82330 ASSAY OF CALCIUM: CPT

## 2022-01-01 PROCEDURE — 36600 WITHDRAWAL OF ARTERIAL BLOOD: CPT

## 2022-01-01 PROCEDURE — 87040 BLOOD CULTURE FOR BACTERIA: CPT | Performed by: FAMILY MEDICINE

## 2022-01-01 PROCEDURE — 83605 ASSAY OF LACTIC ACID: CPT

## 2022-01-01 PROCEDURE — 86038 ANTINUCLEAR ANTIBODIES: CPT | Performed by: FAMILY MEDICINE

## 2022-01-01 PROCEDURE — 71045 X-RAY EXAM CHEST 1 VIEW: CPT

## 2022-01-01 PROCEDURE — 25010000002 METHYLPREDNISOLONE PER 125 MG: Performed by: EMERGENCY MEDICINE

## 2022-01-01 PROCEDURE — 73090 X-RAY EXAM OF FOREARM: CPT

## 2022-01-01 PROCEDURE — 83615 LACTATE (LD) (LDH) ENZYME: CPT

## 2022-01-01 PROCEDURE — 25010000002 MORPHINE PER 10 MG: Performed by: NURSE PRACTITIONER

## 2022-01-01 PROCEDURE — 25010000002 ALTEPLASE PER 1 MG: Performed by: INTERNAL MEDICINE

## 2022-01-01 PROCEDURE — 73610 X-RAY EXAM OF ANKLE: CPT

## 2022-01-01 PROCEDURE — 97161 PT EVAL LOW COMPLEX 20 MIN: CPT

## 2022-01-01 PROCEDURE — 80048 BASIC METABOLIC PNL TOTAL CA: CPT | Performed by: EMERGENCY MEDICINE

## 2022-01-01 PROCEDURE — 86431 RHEUMATOID FACTOR QUANT: CPT | Performed by: INTERNAL MEDICINE

## 2022-01-01 PROCEDURE — 86622 BRUCELLA ANTIBODY: CPT | Performed by: FAMILY MEDICINE

## 2022-01-01 RX ORDER — ETOMIDATE 2 MG/ML
INJECTION INTRAVENOUS
Status: DISCONTINUED
Start: 2022-01-01 | End: 2022-01-01 | Stop reason: HOSPADM

## 2022-01-01 RX ORDER — AMIODARONE HYDROCHLORIDE 50 MG/ML
INJECTION, SOLUTION INTRAVENOUS
Status: COMPLETED | OUTPATIENT
Start: 2022-01-01 | End: 2022-01-01

## 2022-01-01 RX ORDER — TRIAMCINOLONE ACETONIDE 40 MG/ML
20 INJECTION, SUSPENSION INTRA-ARTICULAR; INTRAMUSCULAR ONCE
Status: COMPLETED | OUTPATIENT
Start: 2022-01-01 | End: 2022-01-01

## 2022-01-01 RX ORDER — CHOLECALCIFEROL (VITAMIN D3) 125 MCG
5 CAPSULE ORAL NIGHTLY PRN
Status: DISCONTINUED | OUTPATIENT
Start: 2022-01-01 | End: 2022-01-01 | Stop reason: HOSPADM

## 2022-01-01 RX ORDER — SODIUM CHLORIDE 0.9 % (FLUSH) 0.9 %
3 SYRINGE (ML) INJECTION EVERY 12 HOURS SCHEDULED
Status: DISCONTINUED | OUTPATIENT
Start: 2022-01-01 | End: 2022-01-01 | Stop reason: HOSPADM

## 2022-01-01 RX ORDER — SODIUM CHLORIDE 9 MG/ML
30 INJECTION, SOLUTION INTRAVENOUS CONTINUOUS
Status: DISCONTINUED | OUTPATIENT
Start: 2022-01-01 | End: 2022-01-01

## 2022-01-01 RX ORDER — MULTIPLE VITAMINS W/ MINERALS TAB 9MG-400MCG
1 TAB ORAL DAILY
COMMUNITY

## 2022-01-01 RX ORDER — SODIUM CHLORIDE 0.9 % (FLUSH) 0.9 %
10 SYRINGE (ML) INJECTION AS NEEDED
Status: DISCONTINUED | OUTPATIENT
Start: 2022-01-01 | End: 2022-01-01 | Stop reason: HOSPADM

## 2022-01-01 RX ORDER — SODIUM CHLORIDE 0.9 % (FLUSH) 0.9 %
3-10 SYRINGE (ML) INJECTION AS NEEDED
Status: DISCONTINUED | OUTPATIENT
Start: 2022-01-01 | End: 2022-01-01 | Stop reason: HOSPADM

## 2022-01-01 RX ORDER — MORPHINE SULFATE 2 MG/ML
INJECTION, SOLUTION INTRAMUSCULAR; INTRAVENOUS
Status: DISCONTINUED
Start: 2022-01-01 | End: 2022-01-01 | Stop reason: HOSPADM

## 2022-01-01 RX ORDER — HYDROCODONE BITARTRATE AND ACETAMINOPHEN 5; 325 MG/1; MG/1
1 TABLET ORAL EVERY 6 HOURS PRN
Status: DISPENSED | OUTPATIENT
Start: 2022-01-01 | End: 2022-01-01

## 2022-01-01 RX ORDER — MORPHINE SULFATE 2 MG/ML
2 INJECTION, SOLUTION INTRAMUSCULAR; INTRAVENOUS ONCE
Status: DISCONTINUED | OUTPATIENT
Start: 2022-01-01 | End: 2022-01-01 | Stop reason: HOSPADM

## 2022-01-01 RX ORDER — ACETAMINOPHEN 325 MG/1
650 TABLET ORAL EVERY 4 HOURS PRN
Status: DISCONTINUED | OUTPATIENT
Start: 2022-01-01 | End: 2022-01-01 | Stop reason: HOSPADM

## 2022-01-01 RX ORDER — ONDANSETRON 2 MG/ML
4 INJECTION INTRAMUSCULAR; INTRAVENOUS EVERY 6 HOURS PRN
Status: DISCONTINUED | OUTPATIENT
Start: 2022-01-01 | End: 2022-01-01 | Stop reason: SDUPTHER

## 2022-01-01 RX ORDER — ACETAMINOPHEN 325 MG/1
325 TABLET ORAL EVERY 4 HOURS PRN
Status: DISCONTINUED | OUTPATIENT
Start: 2022-01-01 | End: 2022-01-01 | Stop reason: HOSPADM

## 2022-01-01 RX ORDER — EPINEPHRINE 0.1 MG/ML
SYRINGE (ML) INJECTION
Status: COMPLETED | OUTPATIENT
Start: 2022-01-01 | End: 2022-01-01

## 2022-01-01 RX ORDER — ALLOPURINOL 300 MG/1
300 TABLET ORAL DAILY
Status: DISCONTINUED | OUTPATIENT
Start: 2022-01-01 | End: 2022-01-01 | Stop reason: HOSPADM

## 2022-01-01 RX ORDER — BUMETANIDE 1 MG/1
1 TABLET ORAL DAILY
COMMUNITY

## 2022-01-01 RX ORDER — METHYLPREDNISOLONE SODIUM SUCCINATE 40 MG/ML
20 INJECTION, POWDER, LYOPHILIZED, FOR SOLUTION INTRAMUSCULAR; INTRAVENOUS EVERY 12 HOURS
Status: DISCONTINUED | OUTPATIENT
Start: 2022-01-01 | End: 2022-01-01

## 2022-01-01 RX ORDER — ONDANSETRON 4 MG/1
4 TABLET, FILM COATED ORAL EVERY 6 HOURS PRN
Status: DISCONTINUED | OUTPATIENT
Start: 2022-01-01 | End: 2022-01-01 | Stop reason: HOSPADM

## 2022-01-01 RX ORDER — PANTOPRAZOLE SODIUM 40 MG/1
40 TABLET, DELAYED RELEASE ORAL
Status: DISCONTINUED | OUTPATIENT
Start: 2022-01-01 | End: 2022-01-01 | Stop reason: HOSPADM

## 2022-01-01 RX ORDER — METHYLPREDNISOLONE SODIUM SUCCINATE 125 MG/2ML
125 INJECTION, POWDER, LYOPHILIZED, FOR SOLUTION INTRAMUSCULAR; INTRAVENOUS ONCE
Status: COMPLETED | OUTPATIENT
Start: 2022-01-01 | End: 2022-01-01

## 2022-01-01 RX ORDER — HYDROCODONE BITARTRATE AND ACETAMINOPHEN 5; 325 MG/1; MG/1
1 TABLET ORAL ONCE AS NEEDED
Status: COMPLETED | OUTPATIENT
Start: 2022-01-01 | End: 2022-01-01

## 2022-01-01 RX ORDER — MORPHINE SULFATE 2 MG/ML
1 INJECTION, SOLUTION INTRAMUSCULAR; INTRAVENOUS
Status: DISCONTINUED | OUTPATIENT
Start: 2022-01-01 | End: 2022-01-01

## 2022-01-01 RX ORDER — COLCHICINE 0.6 MG/1
0.6 TABLET ORAL 2 TIMES DAILY
Status: DISCONTINUED | OUTPATIENT
Start: 2022-01-01 | End: 2022-01-01 | Stop reason: HOSPADM

## 2022-01-01 RX ORDER — PREDNISONE 20 MG/1
20 TABLET ORAL
Status: DISCONTINUED | OUTPATIENT
Start: 2022-01-01 | End: 2022-01-01 | Stop reason: HOSPADM

## 2022-01-01 RX ORDER — CALCIUM CHLORIDE 100 MG/ML
INJECTION INTRAVENOUS; INTRAVENTRICULAR
Status: COMPLETED | OUTPATIENT
Start: 2022-01-01 | End: 2022-01-01

## 2022-01-01 RX ORDER — MAGNESIUM SULFATE HEPTAHYDRATE 500 MG/ML
INJECTION, SOLUTION INTRAMUSCULAR; INTRAVENOUS
Status: COMPLETED | OUTPATIENT
Start: 2022-01-01 | End: 2022-01-01

## 2022-01-01 RX ORDER — ONDANSETRON 2 MG/ML
4 INJECTION INTRAMUSCULAR; INTRAVENOUS EVERY 6 HOURS PRN
Status: DISCONTINUED | OUTPATIENT
Start: 2022-01-01 | End: 2022-01-01 | Stop reason: HOSPADM

## 2022-01-01 RX ORDER — ENOXAPARIN SODIUM 150 MG/ML
110 INJECTION SUBCUTANEOUS EVERY 12 HOURS
Status: DISCONTINUED | OUTPATIENT
Start: 2022-01-01 | End: 2022-01-01 | Stop reason: HOSPADM

## 2022-01-01 RX ORDER — LISINOPRIL 20 MG/1
20 TABLET ORAL
Status: DISCONTINUED | OUTPATIENT
Start: 2022-01-01 | End: 2022-01-01 | Stop reason: HOSPADM

## 2022-01-01 RX ORDER — CYCLOBENZAPRINE HCL 10 MG
10 TABLET ORAL ONCE
Status: COMPLETED | OUTPATIENT
Start: 2022-01-01 | End: 2022-01-01

## 2022-01-01 RX ADMIN — Medication 3 ML: at 08:57

## 2022-01-01 RX ADMIN — EPINEPHRINE 1 MG: 0.1 INJECTION, SOLUTION ENDOTRACHEAL; INTRACARDIAC; INTRAVENOUS at 10:27

## 2022-01-01 RX ADMIN — Medication 3 ML: at 21:29

## 2022-01-01 RX ADMIN — Medication 3 ML: at 20:50

## 2022-01-01 RX ADMIN — PREDNISONE 20 MG: 20 TABLET ORAL at 10:37

## 2022-01-01 RX ADMIN — ALLOPURINOL 300 MG: 300 TABLET ORAL at 14:16

## 2022-01-01 RX ADMIN — PANTOPRAZOLE SODIUM 40 MG: 40 TABLET, DELAYED RELEASE ORAL at 05:44

## 2022-01-01 RX ADMIN — LISINOPRIL 20 MG: 20 TABLET ORAL at 14:15

## 2022-01-01 RX ADMIN — PANTOPRAZOLE SODIUM 40 MG: 40 TABLET, DELAYED RELEASE ORAL at 06:01

## 2022-01-01 RX ADMIN — COLCHICINE 0.6 MG: 0.6 TABLET, FILM COATED ORAL at 20:55

## 2022-01-01 RX ADMIN — COLCHICINE 0.6 MG: 0.6 TABLET, FILM COATED ORAL at 08:57

## 2022-01-01 RX ADMIN — PANTOPRAZOLE SODIUM 40 MG: 40 TABLET, DELAYED RELEASE ORAL at 05:19

## 2022-01-01 RX ADMIN — METHYLPREDNISOLONE SODIUM SUCCINATE 20 MG: 40 INJECTION, POWDER, FOR SOLUTION INTRAMUSCULAR; INTRAVENOUS at 22:11

## 2022-01-01 RX ADMIN — METHYLPREDNISOLONE SODIUM SUCCINATE 125 MG: 125 INJECTION, POWDER, FOR SOLUTION INTRAMUSCULAR; INTRAVENOUS at 12:41

## 2022-01-01 RX ADMIN — EPINEPHRINE 1 MG: 0.1 INJECTION, SOLUTION ENDOTRACHEAL; INTRACARDIAC; INTRAVENOUS at 10:17

## 2022-01-01 RX ADMIN — PIFLUFOLASTAT F-18 9.1 MILLICURIE: 80 INJECTION INTRAVENOUS at 14:20

## 2022-01-01 RX ADMIN — HYDROCODONE BITARTRATE AND ACETAMINOPHEN 1 TABLET: 5; 325 TABLET ORAL at 20:16

## 2022-01-01 RX ADMIN — Medication 3 ML: at 20:11

## 2022-01-01 RX ADMIN — HYDROCODONE BITARTRATE AND ACETAMINOPHEN 1 TABLET: 5; 325 TABLET ORAL at 20:54

## 2022-01-01 RX ADMIN — EPINEPHRINE 1 MG: 0.1 INJECTION, SOLUTION ENDOTRACHEAL; INTRACARDIAC; INTRAVENOUS at 09:57

## 2022-01-01 RX ADMIN — LISINOPRIL 20 MG: 20 TABLET ORAL at 08:57

## 2022-01-01 RX ADMIN — EPINEPHRINE 1 MG: 0.1 INJECTION, SOLUTION ENDOTRACHEAL; INTRACARDIAC; INTRAVENOUS at 10:03

## 2022-01-01 RX ADMIN — LISINOPRIL 20 MG: 20 TABLET ORAL at 10:37

## 2022-01-01 RX ADMIN — ALLOPURINOL 300 MG: 300 TABLET ORAL at 09:31

## 2022-01-01 RX ADMIN — TRIAMCINOLONE ACETONIDE 20 MG: 40 INJECTION, SUSPENSION INTRA-ARTICULAR; INTRAMUSCULAR at 09:14

## 2022-01-01 RX ADMIN — LISINOPRIL 20 MG: 20 TABLET ORAL at 09:21

## 2022-01-01 RX ADMIN — EPINEPHRINE 1 MG: 0.1 INJECTION, SOLUTION ENDOTRACHEAL; INTRACARDIAC; INTRAVENOUS at 10:06

## 2022-01-01 RX ADMIN — COLCHICINE 0.6 MG: 0.6 TABLET, FILM COATED ORAL at 09:30

## 2022-01-01 RX ADMIN — COLCHICINE 0.6 MG: 0.6 TABLET, FILM COATED ORAL at 20:11

## 2022-01-01 RX ADMIN — Medication 3 ML: at 20:29

## 2022-01-01 RX ADMIN — Medication 10 ML: at 20:21

## 2022-01-01 RX ADMIN — COLCHICINE 0.6 MG: 0.6 TABLET, FILM COATED ORAL at 20:17

## 2022-01-01 RX ADMIN — EPINEPHRINE 1 MG: 0.1 INJECTION, SOLUTION ENDOTRACHEAL; INTRACARDIAC; INTRAVENOUS at 09:45

## 2022-01-01 RX ADMIN — COLCHICINE 0.6 MG: 0.6 TABLET, FILM COATED ORAL at 20:50

## 2022-01-01 RX ADMIN — HYDROCODONE BITARTRATE AND ACETAMINOPHEN 1 TABLET: 5; 325 TABLET ORAL at 10:34

## 2022-01-01 RX ADMIN — DICLOFENAC SODIUM 4 G: 10 GEL TOPICAL at 12:43

## 2022-01-01 RX ADMIN — CALCIUM CHLORIDE 1 G: 100 INJECTION INTRAVENOUS; INTRAVENTRICULAR at 10:16

## 2022-01-01 RX ADMIN — Medication 3 ML: at 22:12

## 2022-01-01 RX ADMIN — COLCHICINE 0.6 MG: 0.6 TABLET, FILM COATED ORAL at 22:12

## 2022-01-01 RX ADMIN — PANTOPRAZOLE SODIUM 40 MG: 40 TABLET, DELAYED RELEASE ORAL at 05:56

## 2022-01-01 RX ADMIN — SODIUM BICARBONATE 50 MEQ: 84 INJECTION, SOLUTION INTRAVENOUS at 10:04

## 2022-01-01 RX ADMIN — SODIUM BICARBONATE 50 MEQ: 84 INJECTION, SOLUTION INTRAVENOUS at 09:45

## 2022-01-01 RX ADMIN — MAGNESIUM SULFATE HEPTAHYDRATE 2 G: 500 INJECTION, SOLUTION INTRAMUSCULAR; INTRAVENOUS at 10:16

## 2022-01-01 RX ADMIN — EPINEPHRINE 1 MG: 0.1 INJECTION, SOLUTION ENDOTRACHEAL; INTRACARDIAC; INTRAVENOUS at 09:54

## 2022-01-01 RX ADMIN — EPINEPHRINE 1 MG: 0.1 INJECTION, SOLUTION ENDOTRACHEAL; INTRACARDIAC; INTRAVENOUS at 10:23

## 2022-01-01 RX ADMIN — PANTOPRAZOLE SODIUM 40 MG: 40 TABLET, DELAYED RELEASE ORAL at 04:19

## 2022-01-01 RX ADMIN — COLCHICINE 0.6 MG: 0.6 TABLET, FILM COATED ORAL at 09:26

## 2022-01-01 RX ADMIN — HYDROCODONE BITARTRATE AND ACETAMINOPHEN 1 TABLET: 5; 325 TABLET ORAL at 09:34

## 2022-01-01 RX ADMIN — EPINEPHRINE 1 MG: 0.1 INJECTION, SOLUTION ENDOTRACHEAL; INTRACARDIAC; INTRAVENOUS at 10:00

## 2022-01-01 RX ADMIN — Medication 3 ML: at 20:54

## 2022-01-01 RX ADMIN — PREDNISONE 20 MG: 20 TABLET ORAL at 08:57

## 2022-01-01 RX ADMIN — DICLOFENAC SODIUM 4 G: 10 GEL TOPICAL at 20:29

## 2022-01-01 RX ADMIN — DICLOFENAC SODIUM 4 G: 10 GEL TOPICAL at 09:22

## 2022-01-01 RX ADMIN — Medication 3 ML: at 10:37

## 2022-01-01 RX ADMIN — SODIUM CHLORIDE 30 ML/HR: 9 INJECTION, SOLUTION INTRAVENOUS at 02:30

## 2022-01-01 RX ADMIN — HYDROCODONE BITARTRATE AND ACETAMINOPHEN 1 TABLET: 5; 325 TABLET ORAL at 22:12

## 2022-01-01 RX ADMIN — ALLOPURINOL 300 MG: 300 TABLET ORAL at 10:37

## 2022-01-01 RX ADMIN — METHYLPREDNISOLONE SODIUM SUCCINATE 20 MG: 40 INJECTION, POWDER, FOR SOLUTION INTRAMUSCULAR; INTRAVENOUS at 23:50

## 2022-01-01 RX ADMIN — COLCHICINE 0.6 MG: 0.6 TABLET, FILM COATED ORAL at 20:16

## 2022-01-01 RX ADMIN — LISINOPRIL 20 MG: 20 TABLET ORAL at 08:50

## 2022-01-01 RX ADMIN — ALLOPURINOL 300 MG: 300 TABLET ORAL at 09:22

## 2022-01-01 RX ADMIN — PANTOPRAZOLE SODIUM 40 MG: 40 TABLET, DELAYED RELEASE ORAL at 06:22

## 2022-01-01 RX ADMIN — Medication 3 ML: at 09:27

## 2022-01-01 RX ADMIN — EPINEPHRINE 1 MG: 0.1 INJECTION, SOLUTION ENDOTRACHEAL; INTRACARDIAC; INTRAVENOUS at 10:14

## 2022-01-01 RX ADMIN — METHYLPREDNISOLONE SODIUM SUCCINATE 20 MG: 40 INJECTION, POWDER, FOR SOLUTION INTRAMUSCULAR; INTRAVENOUS at 20:17

## 2022-01-01 RX ADMIN — PANTOPRAZOLE SODIUM 40 MG: 40 TABLET, DELAYED RELEASE ORAL at 05:55

## 2022-01-01 RX ADMIN — Medication 3 ML: at 08:50

## 2022-01-01 RX ADMIN — EPINEPHRINE 1 MG: 0.1 INJECTION, SOLUTION ENDOTRACHEAL; INTRACARDIAC; INTRAVENOUS at 10:09

## 2022-01-01 RX ADMIN — COLCHICINE 0.6 MG: 0.6 TABLET, FILM COATED ORAL at 10:37

## 2022-01-01 RX ADMIN — EPINEPHRINE 1 MG: 0.1 INJECTION, SOLUTION ENDOTRACHEAL; INTRACARDIAC; INTRAVENOUS at 10:20

## 2022-01-01 RX ADMIN — ALTEPLASE 50 MG: KIT at 10:13

## 2022-01-01 RX ADMIN — PREDNISONE 20 MG: 20 TABLET ORAL at 08:50

## 2022-01-01 RX ADMIN — ALTEPLASE 50 MG: KIT at 09:50

## 2022-01-01 RX ADMIN — ALLOPURINOL 300 MG: 300 TABLET ORAL at 08:50

## 2022-01-01 RX ADMIN — EPINEPHRINE 1 MG: 0.1 INJECTION, SOLUTION ENDOTRACHEAL; INTRACARDIAC; INTRAVENOUS at 09:48

## 2022-01-01 RX ADMIN — METHYLPREDNISOLONE SODIUM SUCCINATE 20 MG: 40 INJECTION, POWDER, FOR SOLUTION INTRAMUSCULAR; INTRAVENOUS at 12:59

## 2022-01-01 RX ADMIN — IOPAMIDOL 100 ML: 755 INJECTION, SOLUTION INTRAVENOUS at 17:22

## 2022-01-01 RX ADMIN — COLCHICINE 0.6 MG: 0.6 TABLET, FILM COATED ORAL at 09:22

## 2022-01-01 RX ADMIN — COLCHICINE 0.6 MG: 0.6 TABLET, FILM COATED ORAL at 10:20

## 2022-01-01 RX ADMIN — ALLOPURINOL 300 MG: 300 TABLET ORAL at 09:26

## 2022-01-01 RX ADMIN — EPINEPHRINE 1 MG: 0.1 INJECTION, SOLUTION ENDOTRACHEAL; INTRACARDIAC; INTRAVENOUS at 09:17

## 2022-01-01 RX ADMIN — ALLOPURINOL 300 MG: 300 TABLET ORAL at 08:57

## 2022-01-01 RX ADMIN — LISINOPRIL 20 MG: 20 TABLET ORAL at 09:26

## 2022-01-01 RX ADMIN — PREDNISONE 20 MG: 20 TABLET ORAL at 11:53

## 2022-01-01 RX ADMIN — Medication 3 ML: at 10:21

## 2022-01-01 RX ADMIN — PIFLUFOLASTAT F-18 1 DOSE: 80 INJECTION INTRAVENOUS at 14:19

## 2022-01-01 RX ADMIN — Medication 3 ML: at 03:14

## 2022-01-01 RX ADMIN — EPINEPHRINE 1 MG: 0.1 INJECTION, SOLUTION ENDOTRACHEAL; INTRACARDIAC; INTRAVENOUS at 10:30

## 2022-01-01 RX ADMIN — ENOXAPARIN SODIUM 110 MG: 150 INJECTION SUBCUTANEOUS at 04:19

## 2022-01-01 RX ADMIN — Medication 3 ML: at 09:32

## 2022-01-01 RX ADMIN — SODIUM CHLORIDE 30 ML/HR: 9 INJECTION, SOLUTION INTRAVENOUS at 16:51

## 2022-01-01 RX ADMIN — COLCHICINE 0.6 MG: 0.6 TABLET, FILM COATED ORAL at 20:29

## 2022-01-01 RX ADMIN — HYDROCODONE BITARTRATE AND ACETAMINOPHEN 1 TABLET: 5; 325 TABLET ORAL at 10:29

## 2022-01-01 RX ADMIN — DICLOFENAC SODIUM 4 G: 10 GEL TOPICAL at 20:11

## 2022-01-01 RX ADMIN — DICLOFENAC SODIUM 4 G: 10 GEL TOPICAL at 17:26

## 2022-01-01 RX ADMIN — CYCLOBENZAPRINE 10 MG: 10 TABLET, FILM COATED ORAL at 10:29

## 2022-01-01 RX ADMIN — MORPHINE SULFATE 4 MG: 4 INJECTION, SOLUTION INTRAMUSCULAR; INTRAVENOUS at 14:31

## 2022-01-01 RX ADMIN — Medication 3 ML: at 09:22

## 2022-01-01 RX ADMIN — COLCHICINE 0.6 MG: 0.6 TABLET, FILM COATED ORAL at 08:50

## 2022-01-01 RX ADMIN — ENOXAPARIN SODIUM 110 MG: 150 INJECTION SUBCUTANEOUS at 14:31

## 2022-01-01 RX ADMIN — DICLOFENAC SODIUM 4 G: 10 GEL TOPICAL at 17:57

## 2022-01-01 RX ADMIN — PREDNISONE 20 MG: 20 TABLET ORAL at 09:22

## 2022-01-01 RX ADMIN — DICLOFENAC SODIUM 4 G: 10 GEL TOPICAL at 08:50

## 2022-01-01 RX ADMIN — AMIODARONE HYDROCHLORIDE 300 MG: 50 INJECTION, SOLUTION INTRAVENOUS at 10:27

## 2022-01-01 RX ADMIN — METHYLPREDNISOLONE SODIUM SUCCINATE 20 MG: 40 INJECTION, POWDER, FOR SOLUTION INTRAMUSCULAR; INTRAVENOUS at 10:20

## 2022-01-01 RX ADMIN — LISINOPRIL 20 MG: 20 TABLET ORAL at 09:30

## 2022-09-27 NOTE — PROGRESS NOTES
09/27/2022  Foot and Ankle Surgery - New Patient   Provider: Dr. Edmundo Parra DPM  Location: [facility]    Subjective:  Jesus Main is a 67 y.o. male.     Chief Complaint   Patient presents with   • Left Foot - Pain   • Initial Evaluation     DINORA Chambers BRENNA date last seen 2019 date unknown        HPI:   Patient is a 67-year-old male that presents with left foot pain and new lesion involving the dorsal lateral aspect of his left midfoot.  He states that he has noticed this area for the last several weeks and he has started to develop pain with certain shoes and increased activity.  Otherwise, his feet have been doing well.  He has dealt with gout in the past to his lower extremities and states that he is currently dealing with a gout flare to his right upper extremity.  He has had this managed and dealt with.  Patient states that he has not discussed matters with his primary care physician for long time gout management.  Most recent uric acid level was performed last year and remained elevated at 7.4 mg/dL.    No Known Allergies    Past Medical History:   Diagnosis Date   • Gout        Past Surgical History:   Procedure Laterality Date   • HERNIA REPAIR         Family History   Problem Relation Age of Onset   • Hypertension Mother    • Heart disease Mother    • Cancer Father        Social History     Socioeconomic History   • Marital status:    Tobacco Use   • Smoking status: Never Smoker   • Smokeless tobacco: Never Used   Vaping Use   • Vaping Use: Never used   Substance and Sexual Activity   • Alcohol use: Yes     Comment: occasional   • Drug use: Never   • Sexual activity: Defer        Current Outpatient Medications on File Prior to Visit   Medication Sig Dispense Refill   • allopurinol (ZYLOPRIM) 100 MG tablet Take 100 mg by mouth Daily.     • colchicine 0.6 MG tablet Take 0.6 mg by mouth 2 (Two) Times a Day.     • lisinopril-hydrochlorothiazide (PRINZIDE,ZESTORETIC) 20-12.5 MG per  "tablet Take 2 tablets by mouth Daily.     • Multiple Vitamins-Minerals (VISIVITES) tablet VISIVITES TABS     • omeprazole (priLOSEC) 20 MG capsule Take 20 mg by mouth Daily.       No current facility-administered medications on file prior to visit.       Review of Systems:  General: Denies fever, chills, fatigue, and weakness.  Eyes: Denies vision loss, blurry vision, and excessive redness.  ENT: Denies hearing issues and difficulty swallowing.  Cardiovascular: Denies palpitations, chest pain, or syncopal episodes.  Respiratory: Denies shortness of breath, wheezing, and coughing.  GI: Denies abdominal pain, nausea, and vomiting.   : Denies frequency, hematuria, and urgency.  Musculoskeletal: Denies muscle cramps, joint pains, and stiffness.  Derm: Denies rash, open wounds, or suspicious lesions.  Neuro: Denies headaches, numbness, loss of coordination, and tremors.  Psych: Denies anxiety and depression.  Endocrine: Denies temperature intolerance and changes in appetite.  Heme: Denies bleeding disorders or abnormal bruising.     Objective   Pulse 70   Ht 182.9 cm (72\")   Wt 97.1 kg (214 lb)   SpO2 96%   BMI 29.02 kg/m²     Foot/Ankle Exam:       General:   Appearance: appears stated age and healthy    Orientation: AAOx3    Affect: appropriate    Gait: antalgic      VASCULAR      Right Foot Vascularity   Normal vascular exam    Dorsalis pedis:  2+  Posterior tibial:  2+  Skin Temperature: warm    Edema Grading:  None and 1+  CFT:  < 3 seconds  Pedal Hair Growth:  Present  Varicosities: none       Left Foot Vascularity   Normal vascular exam    Dorsalis pedis:  2+  Posterior tibial:  2+  Skin Temperature: warm    Edema Grading:  None and 2+  CFT:  < 3 seconds  Pedal Hair Growth:  Present  Varicosities: none        NEUROLOGIC     Right Foot Neurologic   Light touch sensation:  Normal  Hot/Cold sensation: normal    Achilles reflex:  2+     Left Foot Neurologic   Light touch sensation:  Normal  Hot/cold sensation: " normal    Achilles reflex:  2+     MUSCULOSKELETAL      Right Foot Musculoskeletal   Ecchymosis:  None  Tenderness: calcaneus tenderness and achilles    Arch:  Pes planus  Hallux valgus: Yes    Hallux limitus: Yes       Left Foot Musculoskeletal   Ecchymosis:  None  Tenderness: calcaneus tenderness and subtalar joint    Tenderness comment:  Significant discomfort with palpation to the heel and lateral aspect of the ankle  Arch:  Pes planus  Hallux valgus: Yes    Hallux limitus: Yes       MUSCLE STRENGTH     Right Foot Muscle Strength   Normal strength    Foot dorsiflexion:  5  Foot plantar flexion:  5  Foot inversion:  5  Foot eversion:  5     Left Foot Muscle Strength   Normal strength    Foot dorsiflexion:  5  Foot plantar flexion:  5  Foot inversion:  5  Foot eversion:  5     DERMATOLOGIC     Right Foot Dermatologic   Skin: dry skin    Skin comment:  Eczema noted to the medial aspect of the ankle     Left Foot Dermatologic   Skin: skin intact    Skin comment:  Mild calor to the posterior ankle region      Right Foot Additional Comments Mild discomfort to the Achilles tendon insertion.  Moderate soft tissue and joint rigidity to both foot and ankle.  Moderate equinus contracture with knee extended and flexed, bilateral      Left Foot Additional Comments: Moderate discomfort with palpation to the posterior lateral aspect of the ankle and Achilles tendon region.    09/27/2022 Small ganglion cyst involving the anterior lateral aspect of the midfoot measuring approximately 1 cm in diameter. No periwound erythema, edema, or signs of infection. No significant swelling or discomfort to the feet. Moderate degenerative changes involving the left 1st metatarsophalangeal joint with significant bony proliferation. Limited range of motion.      Assessment & Plan   Diagnoses and all orders for this visit:    1. Left foot pain (Primary)  -     XR Foot 3+ View Left  -     triamcinolone acetonide (KENALOG-40) injection 20 mg    2.  Ganglion cyst of left foot    3. Hallux rigidus of left foot    4. Arthritis of both feet      The patient presents to the clinic as a new patient for evaluation of left foot pain. Imaging was independently showing a significant amount of arthritis in his left foot. I explained that there is also a small ganglion cyst involving the anterior lateral aspect of the midfoot. We discussed treatment options including aspiration and steroid injection. I explained that if the cyst continues to be symptomatic and problematic, it may require surgical resection. The patient is advised to discuss his gout issues with his primary care physician. I encouraged the patient to wear proper shoes and inserts to keep his feet supported. I have asked the patient to consider over-the-counter arch supports today. We will proceed with a steroid injection today.  Patient is to return in approximately 4 to 6 weeks for reevaluation and further discussion.  Greater than 20 minutes spent before, during, and after evaluation for patient care.    Aspiration and steroid injection: Left foot ganglion cyst    Timeout and informed consent was obtained prior to procedure.  The area in question was cleansed with an alcohol prep pad.  A ring block was performed around the lesion utilizing approximately 4 cc of 1% lidocaine plain.  An 18-gauge hypodermic needle was introduced into the ganglion cyst and approximately 2 cc of gelatinous fluid was removed from the lesion highly consistent with a ganglion cyst.  The lesion appeared to be consistent with a tendon sheath.  Once adequately expressed, the lesion was injected with approximately 1 cc of Kenalog.  Hemostasis was achieved and Band-Aid was applied.  Patient tolerated the procedure well.    Orders Placed This Encounter   Procedures   • XR Foot 3+ View Left     Order Specific Question:   Reason for Exam:     Answer:   chronic left foot foot pain calcaneous heel spurs Left foot only bunion rm 15 wb      Order Specific Question:   Does this patient have a diabetic monitoring/medication delivering device on?     Answer:   No     Order Specific Question:   Release to patient     Answer:   Routine Release        Note is dictated utilizing voice recognition software. Unfortunately this leads to occasional typographical errors. I apologize in advance if the situation occurs. If questions occur please do not hesitate to call our office.    Transcribed from ambient dictation for DINORA Parra DPM by Julia Pike.  09/27/22   10:11 EDT

## 2022-10-02 PROBLEM — R60.0 EDEMA OF RIGHT UPPER EXTREMITY: Status: ACTIVE | Noted: 2022-01-01

## 2022-10-02 NOTE — ED PROVIDER NOTES
Subjective   History of Present Illness  History Provided By: Patient    Chief Complaint: Right arm swelling and pain, right low back pain, bilateral ankle pain right worse than left.  Has history of gout.  On allopurinol.  Had steroid injection in left foot a few days ago with Dr. Parra which helped left foot and ankle symptoms.  Right ankle been getting worse especially worse today.  States it hurts too much to bear weight and with the pain and swelling in his right arm he is unable to get up.  Lives alone at home.  Onset: Going on for a while  Timing: Worse this morning  Location: Right upper extremity, right ankle, right low back  Quality: Pain  Severity: Moderate  Modifying Factors: None    Other: Patient hit elbow on microwave few days ago and had increasing swelling.  Swelling all distal to his elbow but goes all the way down to his hands.  States it is actually significant better today than it was previously.  Also having bilateral ankle pain but significantly worse on right.  States it feels like previous gout flares.  Has had no fevers or chills.    Review of Systems   Constitutional: Negative for chills and fever.   Respiratory: Negative for shortness of breath.    Cardiovascular: Negative for chest pain.   Gastrointestinal: Negative for abdominal pain.   Musculoskeletal: Positive for arthralgias and back pain.   All other systems reviewed and are negative.      Past Medical History:   Diagnosis Date   • Gout        No Known Allergies    Past Surgical History:   Procedure Laterality Date   • HERNIA REPAIR         Family History   Problem Relation Age of Onset   • Hypertension Mother    • Heart disease Mother    • Cancer Father        Social History     Socioeconomic History   • Marital status:    Tobacco Use   • Smoking status: Never Smoker   • Smokeless tobacco: Never Used   Vaping Use   • Vaping Use: Never used   Substance and Sexual Activity   • Alcohol use: Yes     Comment: occasional   •  "Drug use: Never   • Sexual activity: Defer           Objective   Physical Exam  Constitutional:  No acute distress.  Head:  Atraumatic.  Normocephalic.   Eyes:  No scleral icterus. Normal conjunctiva  ENT:  Moist mucosa.  No nasal discharge present.  Cardiovascular:  Well perfused.  Equal pulses.  Regular rate.  Normal capillary refill.    Pulmonary/Chest:  No respiratory distress.  Airway patent.  No tachypnea.  No accessory muscle usage.  Auscultation bilateral  Abdominal:  Non-distended. Non-tender.   Back: Right lumbar paraspinal tenderness to palpation with palpable muscle spasm  Extremities: 2+ pitting edema to right upper extremity, strength and sensation intact.  2+ radial pulses.  Swelling to bilateral ankles right worse than left.  Diffuse tenderness to palpation over bilateral joint lines without point tenderness palpation.  No warmth.  No overlying skin changes.  2+ PT and DP pulses bilateral lower extremities.  Skin:  Warm, dry  Neurological:  Alert, awake, and appropriate.  Normal speech.      Procedures           ED Course      /77   Pulse 74   Temp 98.8 °F (37.1 °C)   Resp 16   Ht 182.9 cm (72\")   Wt 97.1 kg (214 lb)   SpO2 97%   BMI 29.02 kg/m²   Labs Reviewed   BASIC METABOLIC PANEL - Abnormal; Notable for the following components:       Result Value    Glucose 120 (*)     Chloride 97 (*)     All other components within normal limits    Narrative:     GFR Normal >60  Chronic Kidney Disease <60  Kidney Failure <15     CBC WITH AUTO DIFFERENTIAL - Abnormal; Notable for the following components:    WBC 13.30 (*)     RBC 3.85 (*)     Hemoglobin 11.6 (*)     Hematocrit 35.8 (*)     All other components within normal limits    Narrative:     The previously reported component NRBC is no longer being reported. Previous result was 0.0 /100 WBC (Reference Range: 0.0-0.2 /100 WBC) on 10/2/2022 at 1032 EDT.   MANUAL DIFFERENTIAL - Abnormal; Notable for the following components:    Neutrophil % " 80.0 (*)     Lymphocyte % 4.0 (*)     Monocyte % 14.0 (*)     Metamyelocyte % 1.0 (*)     Neutrophils Absolute 10.77 (*)     Lymphocytes Absolute 0.53 (*)     Monocytes Absolute 1.86 (*)     All other components within normal limits   SCAN SLIDE   CBC AND DIFFERENTIAL    Narrative:     The following orders were created for panel order CBC & Differential.  Procedure                               Abnormality         Status                     ---------                               -----------         ------                     CBC Auto Differential[[id number]]        Abnormal            Final result               Scan Slide[[id number]]                                       Final result                 Please view results for these tests on the individual orders.     Medications   sodium chloride 0.9 % flush 10 mL (has no administration in time range)   HYDROcodone-acetaminophen (NORCO) 5-325 MG per tablet 1 tablet (1 tablet Oral Given 10/2/22 1029)   cyclobenzaprine (FLEXERIL) tablet 10 mg (10 mg Oral Given 10/2/22 1029)   methylPREDNISolone sodium succinate (SOLU-Medrol) injection 125 mg (125 mg Intravenous Given 10/2/22 1241)     XR Elbow 3+ View Right    Result Date: 10/2/2022  No acute osseous process identified.  Electronically Signed By-Dietre Yip MD On:10/2/2022 11:19 AM This report was finalized on [id number] by  Dieter Yip MD.    XR Forearm 2 View Right    Result Date: 10/2/2022  No acute osseous process identified.  Electronically Signed By-Dieter Yip MD On:10/2/2022 11:19 AM This report was finalized on [id number] by  Dieter Yip MD.    XR Ankle 3+ View Right    Result Date: 10/2/2022  1.     No acute osseous process identified. 2.     Mild degenerative changes as described above.  Electronically Signed By-Dieter Yip MD On:10/2/2022 11:18 AM This report was finalized on [id number] by  Dieter Yip MD.                                         MDM  Differential Dx  (Includes but not limited to): Gout flare, DVT of upper extremity, septic arthritis, osteoarthritis, fracture  Medical Records Reviewed: Dr. Parra recent note that showed drainage of cyst with steroid injection  Labs: Elevated white count, suspect this is secondary to recent steroid injection as patient not having any fevers or other infectious symptoms  Imaging: X-rays negative for acute findings and ultrasound negative for DVT  Telemetry: Not applicable  Discussion: Suspect this is due to to gout flare.  Believe elevated white blood cell count secondary to steroid injection.  Patient unable to ambulate.  Lives alone.  Will need admission for PT eval and further work-up and evaluation.  Accepted by Dr. August.    Final diagnoses:   Edema of right upper extremity   Right ankle pain, unspecified chronicity       ED Disposition  ED Disposition     ED Disposition   Decision to Admit    Condition   --    Comment   Level of Care: Med/Surg [1]   Admitting Physician: CRISTIANE MACKAY [[id number]]   Attending Physician: CRISTIANE MACKAY [[id number]]               No follow-up provider specified.       Medication List      No changes were made to your prescriptions during this visit.          Tommie Sifuentes MD  10/02/22 [id number]

## 2022-10-02 NOTE — ED NOTES
Nursing report ED to floor  Jesus Main  67 y.o.  male    HPI:   Chief Complaint   Patient presents with   • Arm Swelling       Admitting doctor:   Candida Robison MD    Admitting diagnosis:   The primary encounter diagnosis was Edema of right upper extremity. A diagnosis of Right ankle pain, unspecified chronicity was also pertinent to this visit.    Code status:   Current Code Status     Date Active Code Status Order ID Comments User Context       Not on file    Advance Care Planning Activity          Allergies:   Patient has no known allergies.    Isolation:  No active isolations     Fall Risk:  Fall Risk Assessment was completed, and patient is at low risk for falls.   Predictive Model Details         3 (Low) Factor Value    Calculated 10/2/2022 10:06 Age 67    Risk of Fall Model Musculoskeletal Assessment WDL     Skin Assessment WDL     Magnesium not on file     Drug Use No     Shiva Scale not on file     Financial Class Private Insurance     Peripheral Vascular Assessment WDL     Calcium not on file     Sex Male     Gastrointestinal Assessment WDL     Diastolic BP 87     Albumin not on file     Cardiac Assessment WDL     Respiratory Rate 16     Total Bilirubin not on file     Chloride not on file     Potassium not on file     Days after Admission 0.013     Creatinine not on file     ALT not on file         Weight:       10/02/22  0947   Weight: 97.1 kg (214 lb)       Intake and Output  No intake or output data in the 24 hours ending 10/02/22 1320    Diet:        Most recent vitals:   Vitals:    10/02/22 1000 10/02/22 1100 10/02/22 1201 10/02/22 1301   BP: 140/77  130/77 131/74   Pulse: 80 82 74 71   Resp:  16 16 16   Temp:       SpO2: 97% 98% 97% 98%   Weight:       Height:           Active LDAs/IV Access:   Lines, Drains & Airways     Active LDAs     Name Placement date Placement time Site Days    Peripheral IV 10/02/22 1026 Left Antecubital 10/02/22  1026  Antecubital  less than 1                Skin  Condition:   Skin Assessments (last day)     None           Labs (abnormal labs have a star):   Labs Reviewed   BASIC METABOLIC PANEL - Abnormal; Notable for the following components:       Result Value    Glucose 120 (*)     Chloride 97 (*)     All other components within normal limits    Narrative:     GFR Normal >60  Chronic Kidney Disease <60  Kidney Failure <15     CBC WITH AUTO DIFFERENTIAL - Abnormal; Notable for the following components:    WBC 13.30 (*)     RBC 3.85 (*)     Hemoglobin 11.6 (*)     Hematocrit 35.8 (*)     All other components within normal limits    Narrative:     The previously reported component NRBC is no longer being reported. Previous result was 0.0 /100 WBC (Reference Range: 0.0-0.2 /100 WBC) on 10/2/2022 at 1032 EDT.   MANUAL DIFFERENTIAL - Abnormal; Notable for the following components:    Neutrophil % 80.0 (*)     Lymphocyte % 4.0 (*)     Monocyte % 14.0 (*)     Metamyelocyte % 1.0 (*)     Neutrophils Absolute 10.77 (*)     Lymphocytes Absolute 0.53 (*)     Monocytes Absolute 1.86 (*)     All other components within normal limits   SCAN SLIDE   CBC AND DIFFERENTIAL    Narrative:     The following orders were created for panel order CBC & Differential.  Procedure                               Abnormality         Status                     ---------                               -----------         ------                     CBC Auto Differential[[id number]]        Abnormal            Final result               Scan Slide[[id number]]                                       Final result                 Please view results for these tests on the individual orders.       LOC: Person, Place, Time and Situation    Telemetry:  Med/Surg    Cardiac Monitoring Ordered: no    EKG:   No orders to display       Medications Given in the ED:   Medications   sodium chloride 0.9 % flush 10 mL (has no administration in time range)   HYDROcodone-acetaminophen (NORCO) 5-325 MG per tablet 1 tablet (1 tablet  Oral Given 10/2/22 1029)   cyclobenzaprine (FLEXERIL) tablet 10 mg (10 mg Oral Given 10/2/22 1029)   methylPREDNISolone sodium succinate (SOLU-Medrol) injection 125 mg (125 mg Intravenous Given 10/2/22 1241)       Imaging results:  XR Elbow 3+ View Right    Result Date: 10/2/2022  No acute osseous process identified.  Electronically Signed ByFeliz Yip MD On:10/2/2022 11:19 AM This report was finalized on [id number] by  Dieter Yip MD.    XR Forearm 2 View Right    Result Date: 10/2/2022  No acute osseous process identified.  Electronically Signed ByFeliz Yip MD On:10/2/2022 11:19 AM This report was finalized on [id number] by  Dieter Yip MD.    XR Ankle 3+ View Right    Result Date: 10/2/2022  1.     No acute osseous process identified. 2.     Mild degenerative changes as described above.  Electronically Signed ByFeliz Yip MD On:10/2/2022 11:18 AM This report was finalized on [id number] by  Dieter Yip MD.      Social issues:   Social History     Socioeconomic History   • Marital status:    Tobacco Use   • Smoking status: Never Smoker   • Smokeless tobacco: Never Used   Vaping Use   • Vaping Use: Never used   Substance and Sexual Activity   • Alcohol use: Yes     Comment: occasional   • Drug use: Never   • Sexual activity: Defer       NIH Stroke Scale:  Interval: (not recorded)  1a. Level of Consciousness: (not recorded)  1b. LOC Questions: (not recorded)  1c. LOC Commands: (not recorded)  2. Best Gaze: (not recorded)  3. Visual: (not recorded)  4. Facial Palsy: (not recorded)  5a. Motor Arm, Left: (not recorded)  5b. Motor Arm, Right: (not recorded)  6a. Motor Leg, Left: (not recorded)  6b. Motor Leg, Right: (not recorded)  7. Limb Ataxia: (not recorded)  8. Sensory: (not recorded)  9. Best Language: (not recorded)  10. Dysarthria: (not recorded)  11. Extinction and Inattention (formerly Neglect): (not recorded)    Total (NIH Stroke Scale): (not recorded)      Additional notable assessment information:     Nursing report ED to floor:  Mary Cullen RN   10/02/22 13:20 EDT

## 2022-10-02 NOTE — PLAN OF CARE
Goal Outcome Evaluation:  Patient arrived to unit with no complaints. Patient is alert and oriented, able to make needs known to staff. Patient admission and assessment completed with no complications. Will continue to monitor.

## 2022-10-03 PROBLEM — M25.571 RIGHT ANKLE PAIN: Status: ACTIVE | Noted: 2022-01-01

## 2022-10-03 NOTE — PLAN OF CARE
Goal Outcome Evaluation:  Plan of Care Reviewed With: patient Will monitor until end of shift.        Progress: improving

## 2022-10-03 NOTE — THERAPY EVALUATION
Patient Name: Jesus Main  : 1955    MRN: [id number]                              Today's Date: 10/3/2022       Admit Date: 10/2/2022    Visit Dx:     ICD-10-CM ICD-9-CM   1. Edema of right upper extremity  R60.0 782.3   2. Right ankle pain, unspecified chronicity  M25.571 719.47     Patient Active Problem List   Diagnosis   • Recurrent incisional hernia   • Edema of right upper extremity   • Right ankle pain     Past Medical History:   Diagnosis Date   • Gout      Past Surgical History:   Procedure Laterality Date   • HERNIA REPAIR        General Information     Row Name 10/03/22 1059          Physical Therapy Time and Intention    Document Type evaluation  -PG     Mode of Treatment physical therapy  -PG     Row Name 10/03/22 1059          General Information    Patient Profile Reviewed yes  -PG     Prior Level of Function independent:  -PG     Existing Precautions/Restrictions weight bearing  unable to bear weight on both the feet, mod to severe pain  -PG     Barriers to Rehab --  pain in b/l feet  -PG     Row Name 10/03/22 1059          Living Environment    People in Home alone  -PG     Row Name 10/03/22 1059          Home Main Entrance    Number of Stairs, Main Entrance one  -PG     Row Name 10/03/22 1059          Cognition    Orientation Status (Cognition) oriented x 4  -PG     Row Name 10/03/22 1059          Safety Issues, Functional Mobility    Safety Issues Affecting Function (Mobility) safety precaution awareness  -PG     Impairments Affecting Function (Mobility) balance;postural/trunk control;range of motion (ROM);grasp;motor control;strength;pain;endurance/activity tolerance  -PG           User Key  (r) = Recorded By, (t) = Taken By, (c) = Cosigned By    Initials Name Provider Type    PG Joy Jenkins, PT Physical Therapist               Mobility     Row Name 10/03/22 1105          Bed Mobility    Bed Mobility bed mobility (all) activities  -PG     All Activities, Andrew (Bed  Mobility) modified independence  -PG     Assistive Device (Bed Mobility) head of bed elevated  -PG     Row Name 10/03/22 1103          Sit-Stand Transfer    Sit-Stand [place] (Transfers) minimum assist (75% patient effort)  -PG     Assistive Device (Sit-Stand Transfers) --  could not use RW because (R) hand has mod to severe pain  -PG     Comment, (Sit-Stand Transfer) Pivot transfer into the recliner with Min A  -PG     Row Name 10/03/22 1103          Gait/Stairs (Locomotion)    [place] Level (Gait) --  could not do because of mod to severe pain in b/l feet and right hand  -PG           User Key  (r) = Recorded By, (t) = Taken By, (c) = Cosigned By    Initials Name Provider Type    Joy Thomas PT Physical Therapist               Obj/Interventions     Row Name 10/03/22 1108          Range of Motion Comprehensive    General Range of Motion lower extremity range of motion deficits identified;upper extremity range of motion deficits identified  -PG     Comment, General Range of Motion because of pain AROM is limited  -PG     Row Name 10/03/22 1108          Strength Comprehensive (MMT)    Comment, General Manual Muscle Testing (MMT) Assessment BUE GROSSLY 3+/5, BLE GROSSLY 4/5  -PG     Row Name 10/03/22 1108          Balance    Balance Assessment sitting static balance;standing static balance;standing dynamic balance  -PG     Static Sitting Balance modified independence  -PG     Static Standing Balance minimal assist  -PG     Dynamic Standing Balance minimal assist  -PG           User Key  (r) = Recorded By, (t) = Taken By, (c) = Cosigned By    Initials Name Provider Type    Joy Thomas PT Physical Therapist               Goals/Plan     Row Name 10/03/22 1120          Bed Mobility Goal 1 (PT)    Activity/Assistive Device (Bed Mobility Goal 1, PT) bed mobility activities, all  -PG     [place] Level/Cues Needed (Bed Mobility Goal 1, PT) independent  -PG     Time Frame (Bed Mobility Goal 1,  PT) long term goal (LTG);2 weeks  -PG     Row Name 10/03/22 1120          Transfer Goal 1 (PT)    Activity/Assistive Device (Transfer Goal 1, PT) transfers, all  -PG     [place] Level/Cues Needed (Transfer Goal 1, PT) modified independence  -PG     Time Frame (Transfer Goal 1, PT) long term goal (LTG);2 weeks  -PG     Row Name 10/03/22 1120          Gait Training Goal 1 (PT)    Activity/Assistive Device (Gait Training Goal 1, PT) gait (walking locomotion)  -PG     [place] Level (Gait Training Goal 1, PT) minimum assist (75% or more patient effort)  -PG     Distance (Gait Training Goal 1, PT) 150ft  -PG     Time Frame (Gait Training Goal 1, PT) long term goal (LTG);2 weeks  -PG     Row Name 10/03/22 1120          Therapy Assessment/Plan (PT)    Planned Therapy Interventions (PT) balance training;bed mobility training;neuromuscular re-education;gait training;home exercise program;postural re-education;patient/family education;ROM (range of motion);strengthening;transfer training  -PG           User Key  (r) = Recorded By, (t) = Taken By, (c) = Cosigned By    Initials Name Provider Type    PG Joy Jenkins, PT Physical Therapist               Clinical Impression     Row Name 10/03/22 1111          Pain    Pretreatment Pain Rating 6/10  -PG     Posttreatment Pain Rating 6/10  -PG     Pre/Posttreatment Pain Comment PAIN IN (R) HAND AND B/L FET  -PG     Row Name 10/03/22 1111          Plan of Care Review    Plan of Care Reviewed With patient  -PG     Progress improving  -PG     Outcome Evaluation Patient 68 yo male admitted to the [facility] with the complaint of RUE swelling and pain, (R) Lower back pain, B/L ankle pain R>L and unable to bear weight on walking. X Ray is negative for acute findings, US negative for DVT, CBC is abnormal. PMH includes Gout. At baseline, patient was independent in ADLs and he lives alone at home. As per today's evaluation, patient was sitting in the bed with HOB elevated  and did Bed Mob with Mod Ind and pivot transfer to recliner with Min A. Pain in both feet and right arm limited the use of RW and ambulation. Based on assessment, patient is not safe to go home alone, PT recommends SNF to return to [facility].  -PG     Row Name 10/03/22 1111          Therapy Assessment/Plan (PT)    Patient/Family Therapy Goals Statement (PT) To increase ambulation  -PG     Rehab Potential (PT) good, to achieve stated therapy goals  -PG     Criteria for Skilled Interventions Met (PT) yes  -PG     Therapy Frequency (PT) 5 times/wk  -PG     Predicted Duration of Therapy Intervention (PT) d/c  -PG     Row Name 10/03/22 1111          Positioning and Restraints    Pre-Treatment Position in bed  -PG     Post Treatment Position chair  -PG     In Chair notified nsg;call light within reach;encouraged to call for assist;exit alarm on  -PG           User Key  (r) = Recorded By, (t) = Taken By, (c) = Cosigned By    Initials Name Provider Type    PG Joy Jenkins, PT Physical Therapist               Outcome Measures     Row Name 10/03/22 1123 10/03/22 1000       How much help from another person do you currently need...    Turning from your back to your side while in flat bed without using bedrails? 2  -PG 2  -PGA    Moving from lying on back to sitting on the side of a flat bed without bedrails? 2  -PG 2  -PGA    Moving to and from a bed to a chair (including a wheelchair)? 2  -PG 2  -PGA    Standing up from a chair using your arms (e.g., wheelchair, bedside chair)? 3  -PG 3  -PGA    Climbing 3-5 steps with a railing? 2  -PG 2  -PGA    To walk in hospital room? 1  -PG 1  -PGA    AM-PAC 6 Clicks Score (PT) 12  -PG 12  -PGA    Highest level of mobility 4 --> Transferred to chair/commode  -PG 4 --> Transferred to chair/commode  -PGA    Row Name 10/03/22 1123          Functional Assessment    Outcome Measure Options AM-PAC 6 Clicks Basic Mobility (PT)  -PG           User Key  (r) = Recorded By, (t) = Taken By, (c) =  Cosigned By    Initials Name Provider Type    PGA Lexy Slaughter, SELVIN Registered Nurse    PG Joy Jenkins PT Physical Therapist                             Physical Therapy Education                 Title: PT OT SLP Therapies (Done)     Topic: Physical Therapy (Done)     Point: Mobility training (Done)     Learning Progress Summary           Patient Acceptance, E, VU by PG at 10/3/2022 1126                   Point: Precautions (Done)     Learning Progress Summary           Patient Acceptance, E, VU by PG at 10/3/2022 1126                               User Key     Initials Effective Dates Name Provider Type Discipline    PG 09/01/22 -  Joy Jenkins PT Physical Therapist PT              PT Recommendation and Plan  Planned Therapy Interventions (PT): balance training, bed mobility training, neuromuscular re-education, gait training, home exercise program, postural re-education, patient/family education, ROM (range of motion), strengthening, transfer training  Plan of Care Reviewed With: patient  Progress: improving  Outcome Evaluation: Patient 68 yo male admitted to the [facility] with the complaint of RUE swelling and pain, (R) Lower back pain, B/L ankle pain R>L and unable to bear weight on walking. X Ray is negative for acute findings, US negative for DVT, CBC is abnormal. PMH includes Gout. At baseline, patient was independent in ADLs and he lives alone at home. As per today's evaluation, patient was sitting in the bed with HOB elevated and did Bed Mob with Mod Ind and pivot transfer to recliner with Min A. Pain in both feet and right arm limited the use of RW and ambulation. Based on assessment, patient is not safe to go home alone, PT recommends SNF to return to [facility].     Time Calculation:    PT Charges     Row Name 10/03/22 1127             Time Calculation    Start Time 0930  -PG      Stop Time 1000  -PG      Time Calculation (min) 30 min  -PG      PT Received On 10/03/22  -PG      PT - Next  Appointment 10/04/22  -PG      PT Goal Re-Cert Due Date 10/17/22  -PG              Time Calculation- PT    Total Timed Code Minutes- PT 0 minute(s)  -PG            User Key  (r) = Recorded By, (t) = Taken By, (c) = Cosigned By    Initials Name Provider Type    PG Joy Jenkins, PT Physical Therapist              Therapy Charges for Today     Code Description Service Date Service Provider Modifiers Qty    87119971103 HC PT EVAL LOW COMPLEXITY 4 10/3/2022 Joy Jenkins, PT GP 1          PT G-Codes  Outcome Measure Options: AM-PAC 6 Clicks Basic Mobility (PT)  AM-PAC 6 Clicks Score (PT): 12    Joy Jenkins, SAHIL  10/3/2022

## 2022-10-03 NOTE — PLAN OF CARE
Goal Outcome Evaluation:  Plan of Care Reviewed With: patient           Outcome Evaluation: pt has c/o pain throughout day. pt been up in chair resting with legs elevated. will continue to monitor

## 2022-10-03 NOTE — H&P
Patient Care Team:  Chloe Lopez APRN as PCP - General (Nurse Practitioner)    Chief complaint Right arm swelling and pain, right low back pain, bilateral ankle pain    Subjective     Patient is a 67 y.o. male who presents with right arm swelling and pain, right lower back pain, and bilateral ankle pain, right side worse than left. Patient states he has a history of gout and believes this to be a flare up. On allopurinol. Had a steroid injection in the left foot a few days ago with Dr. Parra which helped the left foot symptoms. The right ankle bennett been worse today, that the patient states he is unable to bear weight. Patient lives at home alone.       Onset of symptoms was Going on for a while      Review of Systems   Constitutional: Positive for activity change. Negative for fever.   HENT: Negative.    Eyes: Negative.    Respiratory: Negative.    Cardiovascular: Negative.    Gastrointestinal: Negative.    Endocrine: Negative.    Genitourinary: Negative.    Musculoskeletal: Positive for back pain, gait problem and joint swelling.   Skin: Negative.    Allergic/Immunologic: Negative.    Neurological: Negative for dizziness.   Psychiatric/Behavioral: Negative.           History  Past Medical History:   Diagnosis Date   • Gout      Past Surgical History:   Procedure Laterality Date   • HERNIA REPAIR       Family History   Problem Relation Age of Onset   • Hypertension Mother    • Heart disease Mother    • Cancer Father      Social History     Tobacco Use   • Smoking status: Never Smoker   • Smokeless tobacco: Never Used   Vaping Use   • Vaping Use: Never used   Substance Use Topics   • Alcohol use: Yes     Comment: occasional   • Drug use: Never     Medications Prior to Admission   Medication Sig Dispense Refill Last Dose   • allopurinol (ZYLOPRIM) 100 MG tablet Take 100 mg by mouth Daily.      • bumetanide (BUMEX) 1 MG tablet Take 1 mg by mouth Daily.      • colchicine 0.6 MG tablet Take 0.6 mg by mouth 2  (Two) Times a Day.      • lisinopril-hydrochlorothiazide (PRINZIDE,ZESTORETIC) 20-12.5 MG per tablet Take 2 tablets by mouth Daily.      • multivitamin with minerals tablet tablet Take 1 tablet by mouth Daily.      • omeprazole (priLOSEC) 20 MG capsule Take 20 mg by mouth Daily.        Allergies:  Patient has no known allergies.    Objective     Vital Signs  Temp:  [98 °F (36.7 °C)-98.8 °F (37.1 °C)] 98.3 °F (36.8 °C)  Heart Rate:  [71-86] 84  Resp:  [16-20] 16  BP: (110-140)/(69-87) 110/69     Physical Exam:      General Appearance:    Alert, cooperative, in no acute distress   Head:    Normocephalic, without obvious abnormality, atraumatic   Eyes:            Lids and lashes normal, conjunctivae and sclerae normal, no   icterus, no pallor, corneas clear, PERRLA   Ears:    Ears appear intact with no abnormalities noted   Throat:   No oral lesions, no thrush, oral mucosa moist   Neck:   No adenopathy, supple, trachea midline, no thyromegaly, no   carotid bruit, no JVD   Lungs:     Clear to auscultation,respirations regular, even and                  unlabored    Heart:    Regular rhythm and normal rate, normal S1 and S2, no            murmur, no gallop, no rub, no click   Chest Wall:    No abnormalities observed   Abdomen:     Normal bowel sounds, no masses, no organomegaly, soft        non-tender, non-distended, no guarding, no rebound                tenderness   Extremities:   2+ Edema to right upper extremity, +1 edema to bilateral ankles   Pulses:   Pulses palpable and equal bilaterally   Skin:   No bleeding, bruising or rash   Lymph nodes:   No palpable adenopathy   Neurologic:   Cranial nerves 2 - 12 grossly intact, sensation intact, DTR       present and equal bilaterally       Results Review:     Imaging Results (Last 24 Hours)     Procedure Component Value Units Date/Time    XR Chest 1 View [[id number]] Collected: 10/02/22 1624     Updated: 10/02/22 1627    Narrative:      XR CHEST 1 VW-     Date of Exam:  10/2/2022 3:57 PM     Indication: systemic inflammatory response; R60.0-Localized edema;  M25.571-Pain in right ankle and joints of right foot.     Comparison Exams: PET/CT from 09/21/2022     Technique: Single AP chest radiograph     FINDINGS:  The lungs are clear. The heart and mediastinal contours appear normal.  The pulmonary vasculature appears normal. The osseous structures appear  intact.       Impression:      No acute cardiopulmonary process identified.     Electronically Signed By-Dieter Yip MD On:10/2/2022 4:25 PM  This report was finalized on [id number] by  Dieter Yip MD.    XR Forearm 2 View Right [[id number]] Collected: 10/02/22 1118     Updated: 10/02/22 1121    Narrative:      DATE OF EXAM:  10/2/2022 11:05 AM     PROCEDURE:  XR ELBOW 3+ VW RIGHT-, XR FOREARM 2 VW RIGHT-     INDICATIONS:  Struck right elbow had pain and swelling ever since     COMPARISON:  No Comparisons Available     TECHNIQUE:   A minimum of three radiologic views of the right elbow were obtained. 3  radiographs of the right forearm were also obtained.        FINDINGS:  No acute fracture is identified. No focal osseous abnormality is  identified. The joints appear congruent. The soft tissues are  unremarkable.        Impression:      No acute osseous process identified.     Electronically Signed By-Dieter Yip MD On:10/2/2022 11:19 AM  This report was finalized on [id number] by  Dieter Yip MD.    XR Elbow 3+ View Right [[id number]] Collected: 10/02/22 1118     Updated: 10/02/22 1121    Narrative:      DATE OF EXAM:  10/2/2022 11:05 AM     PROCEDURE:  XR ELBOW 3+ VW RIGHT-, XR FOREARM 2 VW RIGHT-     INDICATIONS:  Struck right elbow had pain and swelling ever since     COMPARISON:  No Comparisons Available     TECHNIQUE:   A minimum of three radiologic views of the right elbow were obtained. 3  radiographs of the right forearm were also obtained.        FINDINGS:  No acute fracture is identified. No  focal osseous abnormality is  identified. The joints appear congruent. The soft tissues are  unremarkable.        Impression:      No acute osseous process identified.     Electronically Signed By-Dieter Yip MD On:10/2/2022 11:19 AM  This report was finalized on [id number] by  Dieter Yip MD.    XR Ankle 3+ View Right [[id number]] Collected: 10/02/22 1114     Updated: 10/02/22 1120    Narrative:      XR ANKLE 3+ VW RIGHT-     Date of Exam: 10/2/2022 11:05 AM     Indication: pain over lateral mal.     Comparison Exams: None available.     Technique: 3 radiographs of the right ankle     FINDINGS:  No acute fracture is identified. No focal osseous abnormality is  identified. The mortise is congruent. The talar dome appears intact. The  soft tissues are unremarkable. There is a small enthesophyte along the  posterior calcaneus.       Impression:      1.     No acute osseous process identified.  2.     Mild degenerative changes as described above.     Electronically Signed By-Dieter Yip MD On:10/2/2022 11:18 AM  This report was finalized on [id number] by  Dieter Yip MD.           Lab Results (last 24 hours)     Procedure Component Value Units Date/Time    Basic Metabolic Panel [[id number]]  (Abnormal) Collected: 10/02/22 1106    Specimen: Blood Updated: 10/02/22 1126     Glucose 120 mg/dL      BUN 18 mg/dL      Creatinine 1.27 mg/dL      Sodium 137 mmol/L      Potassium 4.2 mmol/L      Comment: Slight hemolysis detected by analyzer. Results may be affected.        Chloride 97 mmol/L      CO2 28.0 mmol/L      Calcium 9.8 mg/dL      BUN/Creatinine Ratio 14.2     Anion Gap 12.0 mmol/L      eGFR 61.2 mL/min/1.73      Comment: National Kidney Foundation and American Society of Nephrology (ASN) Task Force recommended calculation based on the Chronic Kidney Disease Epidemiology Collaboration (CKD-EPI) equation refit without adjustment for race.       Narrative:      GFR Normal >60  Chronic Kidney  Disease <60  Kidney Failure <15      Manual Differential [[id number]]  (Abnormal) Collected: 10/02/22 1026    Specimen: Blood Updated: 10/02/22 1107     Neutrophil % 80.0 %      Lymphocyte % 4.0 %      Monocyte % 14.0 %      Bands %  1.0 %      Metamyelocyte % 1.0 %      Neutrophils Absolute 10.77 10*3/mm3      Lymphocytes Absolute 0.53 10*3/mm3      Monocytes Absolute 1.86 10*3/mm3      RBC Morphology Normal     WBC Morphology Normal     Platelet Morphology Normal    CBC & Differential [[id number]]  (Abnormal) Collected: 10/02/22 1026    Specimen: Blood Updated: 10/02/22 1107    Narrative:      The following orders were created for panel order CBC & Differential.  Procedure                               Abnormality         Status                     ---------                               -----------         ------                     CBC Auto Differential[[id number]]        Abnormal            Final result               Scan Slide[[id number]]                                       Final result                 Please view results for these tests on the individual orders.    CBC Auto Differential [[id number]]  (Abnormal) Collected: 10/02/22 1026    Specimen: Blood Updated: 10/02/22 1107     WBC 13.30 10*3/mm3      RBC 3.85 10*6/mm3      Hemoglobin 11.6 g/dL      Hematocrit 35.8 %      MCV 92.9 fL      MCH 30.0 pg      MCHC 32.3 g/dL      RDW 13.9 %      RDW-SD 44.6 fl      MPV 8.9 fL      Platelets 256 10*3/mm3     Narrative:      The previously reported component NRBC is no longer being reported. Previous result was 0.0 /100 WBC (Reference Range: 0.0-0.2 /100 WBC) on 10/2/2022 at 1032 EDT.    Scan Slide [[id number]] Collected: 10/02/22 1026    Specimen: Blood Updated: 10/02/22 1107     Scan Slide --     Comment: See Manual Differential Results              I reviewed the patient's new clinical results.    Assessment & Plan     Edema of right upper extremity- upon assessment pt reports the edema and pain has improved since  steroids. Able to move right arm now, previously was not able to  Ankle pain    Patient lives home alone and is unable to bear weight to walk. Admitted for PT evaluation and further workup, appears to be a gout flare up.     DVT prophylaxis- SCD's  GI prophylaxis- Protonix    I discussed the patient's findings and my recommendations with patient.     Leyla Mendez, APRN  10/03/22  01:31 EDT

## 2022-10-03 NOTE — PLAN OF CARE
Goal Outcome Evaluation:  Plan of Care Reviewed With: patient        Progress: improving  Outcome Evaluation: Patient 68 yo male admitted to the [facility] with the complaint of RUE swelling and pain, (R) Lower back pain, B/L ankle pain R>L and unable to bear weight on walking. X Ray is negative for acute findings, US negative for DVT, CBC is abnormal. PMH includes Gout. At baseline, patient was independent in ADLs and he lives alone at home. As per today's evaluation, patient was sitting in the bed with HOB elevated and did Bed Mob with Mod Ind and pivot transfer to recliner with Min A. Pain in both feet and right arm limited the use of RW and ambulation. Based on assessment, patient is not safe to go home alone, PT recommends SNF to return to [facility].

## 2022-10-03 NOTE — CASE MANAGEMENT/SOCIAL WORK
Discharge Planning Assessment  [facility]     Patient Name: Jesus Main  MRN: [id number]  Today's Date: 10/3/2022    Admit Date: 10/2/2022    Plan: Anticipate routine home.   Discharge Needs Assessment     Row Name 10/03/22 1202       Living Environment    People in Home alone    Current Living Arrangements home    Primary Care Provided by self    Provides Primary Care For no one    Family Caregiver if Needed other relative(s)    Family Caregiver Names Cousin- Yuval    Quality of Family Relationships helpful;involved;supportive    Able to Return to Prior Arrangements yes       Resource/Environmental Concerns    Resource/Environmental Concerns none    Transportation Concerns none       Transition Planning    Patient/Family Anticipates Transition to home    Patient/Family Anticipated Services at Transition none    Transportation Anticipated family or friend will provide       Discharge Needs Assessment    Readmission Within the Last 30 Days no previous admission in last 30 days    Equipment Currently Used at Home none    Concerns to be Addressed denies needs/concerns at this time;no discharge needs identified    Anticipated Changes Related to Illness none    Equipment Needed After Discharge none    Provided Post Acute Provider List? N/A    Provided Post Acute Provider Quality & Resource List? N/A               Discharge Plan     Row Name 10/03/22 1206       Plan    Plan Anticipate routine home.    Patient/Family in Agreement with Plan yes    Provided Post Acute Provider List? N/A    Provided Post Acute Provider Quality & Resource List? N/A    Plan Comments Met with patient and family member at bedside. Pt lives at home alone, drives, and is independent with ADL's. PCP and pharmacy confirmed. Pt denies DME use at home. Pt discussed issues with cost of Myrbetriq? previously prescribed by urology. Not currently listed on medication list. Discussed possible use of GoodRX coupon but unable to verify dosage, etc. GoodRx  coupons for 30 day supply show ~$400. Will provide information to pt. Family to provide transportation at discharge. No needs or services identified at this time.              Expected Discharge Date and Time     Expected Discharge Date Expected Discharge Time    Oct 4, 2022          Demographic Summary     Row Name 10/03/22 1202       General Information    Admission Type observation    Arrived From emergency department    Referral Source admission list    Reason for Consult discharge planning;care coordination/care conference    Preferred Language English       Contact Information    Permission Granted to Share Info With                Functional Status     Row Name 10/03/22 1202       Functional Status    Usual Activity Tolerance good    Current Activity Tolerance good       Functional Status, IADL    Medications independent    Meal Preparation independent    Housekeeping independent    Laundry independent    Shopping independent              Met with patient in room wearing PPE: mask, face shield/goggles.      Maintained distance greater than six feet and spent less than 15 minutes in the room.      Megan Naegele, RN      Office Phone: [phone]  Office Cell: [phone]

## 2022-10-04 NOTE — THERAPY TREATMENT NOTE
"Subjective: Pt agreeable to therapeutic plan of care. Pt reports that use of rw does help to decrease LE pain.     Objective:     Bed mobility - Min-A  Transfers - Min-A and with rolling walker  Ambulation - 2 feet Min-A and with rolling walker; distance for amb limited due to pt being on continuous external catheter; pt reports he will need double depends, as he is urinary incontinent and has to wear a lot of protection to prevent accident when amb. Pt was able to bear minimal amount of wt on RUE to assist w/ balance using rw.     Vitals: WNL    Pain: 6 VAS; pain originally 7/10 in RUE and BLEs ; less pain in BLEs after amb.   Education: Provided education on importance of mobility and skilled verbal / tactile cueing throughout intervention.     Assessment: Jesus WONG Jose David tolerated wb better w/ use of rw. Should be able to progress to amb over longer distance at next rx. Pt presents with functional mobility impairments which indicate the need for skilled intervention. Tolerating session today without incident. Will continue to follow and progress as tolerated.     Plan/Recommendations:   Moderate Intensity Therapy recommended post-acute care. This is recommended as therapy feels the patient would require 3-4 days per week and wouldn't tolerate \"3 hour daily\" rehab intensity. SNF would be the preferred choice. If the patient does not agree to SNF, arrange HH or OP depending on home bound status. If patient is medically complex, consider LTACH.. Pt requires no DME at discharge.     Pt desires Skilled Rehab placement at discharge. Pt cooperative; agreeable to therapeutic recommendations and plan of care.         Basic Mobility 6-click:  Rollin = Total, A lot = 2, A little = 3; 4 = None  Supine>Sit:   1 = Total, A lot = 2, A little = 3; 4 = None   Sit>Stand with arms:  1 = Total, A lot = 2, A little = 3; 4 = None  Bed>Chair:   1 = Total, A lot = 2, A little = 3; 4 = None  Ambulate in room:  1 = Total, A " lot = 2, A little = 3; 4 = None  3-5 Steps with railin = Total, A lot = 2, A little = 3; 4 = None  Score: 16    Modified Katharine: N/A = No pre-op stroke/TIA    Post-Tx Position: Up in Chair, Alarms activated and Call light and personal items within reach; LEs elevated  PPE: mask, gloves and eye protection

## 2022-10-04 NOTE — DISCHARGE PLACEMENT REQUEST
"Jesus Coates (67 y.o. Male)             Date of Birth   1955    Social Security Number       Address   [address] [place] [address] [address] [place] [address]    Home Phone   [phone]    MRN   [id number]       Quaker   None    Marital Status                               Admission Date   10/2/22    Admission Type   Emergency    Admitting Provider   Candida Robison MD    Attending Provider   Candida Robison MD    Department, Room/Bed   [facility] 3C MEDICAL INPATIENT, 358/1       Discharge Date       Discharge Disposition       Discharge Destination                               Attending Provider: Candida Robison MD    Allergies: No Known Allergies    Isolation: None   Infection: None   Code Status: CPR   Advance Care Planning Activity    Ht: 182.9 cm (72\")   Wt: 101 kg (222 lb 10.6 oz)    Admission Cmt: None   Principal Problem: None                Active Insurance as of 10/2/2022     Primary Coverage     Payor Plan Insurance Group Employer/Plan Group    [id number]     Payor Plan Address Payor Plan Phone Number Payor Plan Fax Number Effective Dates    [address] [address] [phone]  1/1/2016 - None Entered    [facility] [id number]       Subscriber Name Subscriber Birth Date Member ID       JESUS COATES 1955 [id number]                 Emergency Contacts      (Rel.) Home Phone Work Phone Mobile Phone    Cande Coateslawanda (Sister) -- -- [phone]        "

## 2022-10-04 NOTE — CASE MANAGEMENT/SOCIAL WORK
Continued Stay Note  [facility]     Patient Name: Jesus Main  MRN: [id number]  Today's Date: 10/4/2022    Admit Date: 10/2/2022    Plan: Referral to [facility] pending. Will require precert. PASRR per facility.   Discharge Plan     Row Name 10/04/22 1629       Plan    Plan Referral to [facility] pending. Will require precert. PASRR per facility.    Patient/Family in Agreement with Plan yes    Provided Post Acute Provider List? Yes    Post Acute Provider List Nursing Home    Delivered To Patient    Method of Delivery In person    Plan Comments Met with patient at bedside to discuss PT's recommendations of SNF/rehab. Provided SNF list to patient but his first choice is [facility]. Explained the difference in acute vs. subacute rehab and asked that pt review SNF list in the event [facility] cannot accept. Referral sent in [facility] and to liaisons Padmini and Guillermina. Notified by Padmini that patient's diagnosis will not be approved by insurance for acute rehab. Notified patient at bedside and asked that pt call CM with SNF choices. Received follow up return phone call from patient this afternoon with choices of [facility] and [facility]. Referral sent to [facility] and [facility]. Awaiting response.              Met with patient in room wearing PPE: mask, face shield/goggles.      Maintained distance greater than six feet and spent less than 15 minutes in the room.      Megan Naegele, RN      Office Phone: [phone]  Office Cell: [phone]

## 2022-10-04 NOTE — PROGRESS NOTES
LOS: 1 day   Patient Care Team:  Chloe Lopez APRN as PCP - General (Nurse Practitioner)    Subjective     Swelling and pain have improved    Review of Systems   Constitutional: Positive for activity change.   HENT: Negative.    Respiratory: Negative.    Cardiovascular: Negative.    Gastrointestinal: Negative.    Genitourinary: Negative.    Musculoskeletal: Positive for gait problem.   Neurological: Positive for weakness.           Objective     Vital Signs  Temp:  [97.2 °F (36.2 °C)-98 °F (36.7 °C)] 98 °F (36.7 °C)  Heart Rate:  [61-93] 93  Resp:  [17-20] 17  BP: (126-145)/(71-83) 126/79      Physical Exam  Vitals reviewed.   Constitutional:       Appearance: He is ill-appearing.   HENT:      Head: Normocephalic and atraumatic.      Nose: Nose normal.      Mouth/Throat:      Mouth: Mucous membranes are moist.   Eyes:      General:         Right eye: No discharge.         Left eye: No discharge.   Cardiovascular:      Rate and Rhythm: Normal rate and regular rhythm.      Pulses: Normal pulses.      Heart sounds: Normal heart sounds.   Pulmonary:      Effort: Pulmonary effort is normal.      Breath sounds: Normal breath sounds.   Abdominal:      General: Bowel sounds are normal.      Palpations: Abdomen is soft.   Musculoskeletal:         General: Swelling and tenderness present.      Cervical back: Normal range of motion.   Skin:     General: Skin is warm and dry.   Neurological:      Mental Status: He is alert and oriented to person, place, and time.   Psychiatric:         Behavior: Behavior normal.              Results Review:    Lab Results (last 24 hours)     Procedure Component Value Units Date/Time    Blood Culture - Blood, Hand, Left [[id number]]  (Normal) Collected: 10/03/22 0249    Specimen: Blood from Hand, Left Updated: 10/04/22 0347     Blood Culture No growth at 24 hours    Blood Culture - Blood, Hand, Right [[id number]]  (Normal) Collected: 10/03/22 0249    Specimen: Blood from Hand, Right  Updated: 10/04/22 0347     Blood Culture No growth at 24 hours    Basic Metabolic Panel [[id number]]  (Abnormal) Collected: 10/04/22 0128    Specimen: Blood Updated: 10/04/22 0335     Glucose 132 mg/dL      BUN 33 mg/dL      Creatinine 1.18 mg/dL      Sodium 137 mmol/L      Potassium 4.7 mmol/L      Chloride 102 mmol/L      CO2 24.0 mmol/L      Calcium 9.6 mg/dL      BUN/Creatinine Ratio 28.0     Anion Gap 11.0 mmol/L      eGFR 66.8 mL/min/1.73      Comment: National Kidney Foundation and American Society of Nephrology (ASN) Task Force recommended calculation based on the Chronic Kidney Disease Epidemiology Collaboration (CKD-EPI) equation refit without adjustment for race.       Narrative:      GFR Normal >60  Chronic Kidney Disease <60  Kidney Failure <15      CBC & Differential [[id number]]  (Abnormal) Collected: 10/04/22 0128    Specimen: Blood Updated: 10/04/22 0319    Narrative:      The following orders were created for panel order CBC & Differential.  Procedure                               Abnormality         Status                     ---------                               -----------         ------                     CBC Auto Differential[[id number]]        Abnormal            Final result                 Please view results for these tests on the individual orders.    CBC Auto Differential [[id number]]  (Abnormal) Collected: 10/04/22 0128    Specimen: Blood Updated: 10/04/22 0319     WBC 15.60 10*3/mm3      RBC 3.72 10*6/mm3      Hemoglobin 11.3 g/dL      Hematocrit 33.7 %      MCV 90.5 fL      MCH 30.3 pg      MCHC 33.5 g/dL      RDW 14.1 %      RDW-SD 45.1 fl      MPV 9.1 fL      Platelets 254 10*3/mm3      Neutrophil % 91.5 %      Lymphocyte % 2.6 %      Monocyte % 5.7 %      Eosinophil % 0.0 %      Basophil % 0.2 %      Neutrophils, Absolute 14.30 10*3/mm3      Lymphocytes, Absolute 0.40 10*3/mm3      Monocytes, Absolute 0.90 10*3/mm3      Eosinophils, Absolute 0.00 10*3/mm3      Basophils, Absolute  0.00 10*3/mm3      nRBC 0.0 /100 WBC     Uric Acid [[id number]]  (Normal) Collected: 10/03/22 1228    Specimen: Blood Updated: 10/03/22 1328     Uric Acid 7.0 mg/dL     BOONE [[id number]]  (Normal) Collected: 10/03/22 0249    Specimen: Blood Updated: 10/03/22 1157     Antinuclear Antibodies (BOONE) Negative    Tick Panel - Blood, Arm, Left [[id number]] Collected: 10/03/22 0249    Specimen: Blood from Arm, Left Updated: 10/03/22 1059    Narrative:      The following orders were created for panel order Tick Panel - Blood, Arm, Left.  Procedure                               Abnormality         Status                     ---------                               -----------         ------                     Febrile Antibody Profile[[id number]]                         In process                 E. Chaffeenis-HME (Monoc...[[id number]]                      In process                 Human Granulocytic Ehrli...[[id number]]                      In process                 Lyme Antibody IgG[[id number]]            Normal              Final result                 Please view results for these tests on the individual orders.    Lyme Antibody IgG [[id number]]  (Normal) Collected: 10/03/22 0249    Specimen: Blood from Arm, Left Updated: 10/03/22 1059     Lyme Ab IgG Negative           Imaging Results (Last 24 Hours)     ** No results found for the last 24 hours. **               I reviewed the patient's new clinical results.    Medication Review:   Scheduled Meds:allopurinol, 300 mg, Oral, Daily  colchicine, 0.6 mg, Oral, BID  lisinopril, 20 mg, Oral, Q24H  methylPREDNISolone sodium succinate, 20 mg, Intravenous, Q12H  pantoprazole, 40 mg, Oral, Q AM  sodium chloride, 3 mL, Intravenous, Q12H      Continuous Infusions:sodium chloride, 30 mL/hr, Last Rate: 30 mL/hr (10/04/22 0932)      PRN Meds:.•  acetaminophen  •  acetaminophen  •  HYDROcodone-acetaminophen  •  melatonin  •  Morphine  •  ondansetron **OR** ondansetron  •  [COMPLETED] Insert  peripheral IV **AND** sodium chloride  •  sodium chloride     Interval History:    Assessment & Plan     Edema of right upper extremity- upon assessment pt reports the edema and pain has improved since steroids  Ankle pain  HTN  Gout       DVT prophylaxis- SCD's  GI prophylaxis- Protonix       Plan for disposition:inpt rehab    BRENNA Boyle  10/04/22  09:40 EDT

## 2022-10-04 NOTE — PLAN OF CARE
Goal Outcome Evaluation:  Plan of Care Reviewed With: patient        Progress: improving  Pt responding well to solumedrol.  It has helped with mobility of joints and swelling.  Will continue to monitor until end of shift.

## 2022-10-04 NOTE — PLAN OF CARE
"Goal Outcome Evaluation:      Assessment: Jesus WONG Jose David tolerated wb better w/ use of rw. Should be able to progress to amb over longer distance at next rx. Pt presents with functional mobility impairments which indicate the need for skilled intervention. Tolerating session today without incident. Will continue to follow and progress as tolerated.     Plan/Recommendations:   Moderate Intensity Therapy recommended post-acute care. This is recommended as therapy feels the patient would require 3-4 days per week and wouldn't tolerate \"3 hour daily\" rehab intensity. SNF would be the preferred choice. If the patient does not agree to SNF, arrange HH or OP depending on home bound status. If patient is medically complex, consider LTACH.. Pt requires no DME at discharge.     Pt desires Skilled Rehab placement at discharge. Pt cooperative; agreeable to therapeutic recommendations and plan of care.              "

## 2022-10-04 NOTE — PLAN OF CARE
Goal Outcome Evaluation:              Outcome Evaluation: patient has been resting most opf day. made a statement that hes feeling better. will continue to monitor.

## 2022-10-05 NOTE — CASE MANAGEMENT/SOCIAL WORK
Continued Stay Note  [facility]     Patient Name: Jesus Main  MRN: [id number]  Today's Date: 10/5/2022    Admit Date: 10/2/2022    Plan: Accepted to [facility] pending precert. Precert to be started today 10/5. PASRR per facility.   Discharge Plan     Row Name 10/05/22 1417       Plan    Plan Accepted to [facility] pending precert. Precert to be started today 10/5. PASRR per facility.    Patient/Family in Agreement with Plan yes    Plan Comments CM confirmed with facility liaison Julia that pt is accepted and precert started 10/5. Pharmacy updated to [facility].              Phone communication or documentation only - no physical contact with patient or family.      Megan Naegele, RN      Office Phone: [phone]  Office Cell: [phone]

## 2022-10-05 NOTE — PLAN OF CARE
Goal Outcome Evaluation:  Plan of Care Reviewed With: patient        Progress: improving Pt progressing with treatment.  Will continue to monitor until end of shift.

## 2022-10-05 NOTE — PROGRESS NOTES
LOS: 1 day   Patient Care Team:  Chloe Lopez APRN as PCP - General (Nurse Practitioner)    Subjective     Continues to improve with swelling and movement also has bilateral ankle tenderness    Review of Systems   Constitutional: Positive for activity change.   HENT: Negative.    Respiratory: Negative.    Cardiovascular: Negative.    Gastrointestinal: Negative.    Genitourinary: Negative.    Musculoskeletal: Positive for gait problem.   Neurological: Positive for weakness.           Objective     Vital Signs  Temp:  [97.9 °F (36.6 °C)-98 °F (36.7 °C)] 97.9 °F (36.6 °C)  Heart Rate:  [67-94] 67  Resp:  [18] 18  BP: (132-146)/(78-86) 137/81      Physical Exam  Vitals reviewed.   Constitutional:       Appearance: He is ill-appearing.   HENT:      Head: Normocephalic and atraumatic.      Nose: Nose normal.      Mouth/Throat:      Mouth: Mucous membranes are moist.   Eyes:      General:         Right eye: No discharge.         Left eye: No discharge.   Cardiovascular:      Rate and Rhythm: Normal rate and regular rhythm.      Pulses: Normal pulses.      Heart sounds: Normal heart sounds.   Pulmonary:      Effort: Pulmonary effort is normal.      Breath sounds: Normal breath sounds.   Abdominal:      General: Bowel sounds are normal.      Palpations: Abdomen is soft.   Musculoskeletal:         General: Swelling and tenderness present.      Cervical back: Normal range of motion.   Skin:     General: Skin is warm and dry.   Neurological:      Mental Status: He is alert and oriented to person, place, and time.   Psychiatric:         Behavior: Behavior normal.              Results Review:    Lab Results (last 24 hours)     Procedure Component Value Units Date/Time    Basic Metabolic Panel [[id number]]  (Abnormal) Collected: 10/05/22 0514    Specimen: Blood Updated: 10/05/22 0604     Glucose 135 mg/dL      BUN 29 mg/dL      Creatinine 0.91 mg/dL      Sodium 135 mmol/L      Potassium 5.1 mmol/L      Chloride 102  mmol/L      CO2 25.0 mmol/L      Calcium 9.3 mg/dL      BUN/Creatinine Ratio 31.9     Anion Gap 8.0 mmol/L      eGFR 91.2 mL/min/1.73      Comment: National Kidney Foundation and American Society of Nephrology (ASN) Task Force recommended calculation based on the Chronic Kidney Disease Epidemiology Collaboration (CKD-EPI) equation refit without adjustment for race.       Narrative:      GFR Normal >60  Chronic Kidney Disease <60  Kidney Failure <15      CBC & Differential [[id number]]  (Abnormal) Collected: 10/05/22 0514    Specimen: Blood Updated: 10/05/22 0548    Narrative:      The following orders were created for panel order CBC & Differential.  Procedure                               Abnormality         Status                     ---------                               -----------         ------                     CBC Auto Differential[[id number]]        Abnormal            Final result                 Please view results for these tests on the individual orders.    CBC Auto Differential [[id number]]  (Abnormal) Collected: 10/05/22 0514    Specimen: Blood Updated: 10/05/22 0548     WBC 13.10 10*3/mm3      RBC 3.88 10*6/mm3      Hemoglobin 11.5 g/dL      Hematocrit 35.4 %      MCV 91.3 fL      MCH 29.8 pg      MCHC 32.6 g/dL      RDW 14.4 %      RDW-SD 46.4 fl      MPV 8.3 fL      Platelets 298 10*3/mm3      Neutrophil % 91.9 %      Lymphocyte % 3.6 %      Monocyte % 4.4 %      Eosinophil % 0.0 %      Basophil % 0.1 %      Neutrophils, Absolute 12.10 10*3/mm3      Lymphocytes, Absolute 0.50 10*3/mm3      Monocytes, Absolute 0.60 10*3/mm3      Eosinophils, Absolute 0.00 10*3/mm3      Basophils, Absolute 0.00 10*3/mm3      nRBC 0.0 /100 WBC     Blood Culture - Blood, Hand, Left [[id number]]  (Normal) Collected: 10/03/22 0249    Specimen: Blood from Hand, Left Updated: 10/05/22 0345     Blood Culture No growth at 2 days    Blood Culture - Blood, Hand, Right [[id number]]  (Normal) Collected: 10/03/22 0249    Specimen:  Blood from Hand, Right Updated: 10/05/22 0345     Blood Culture No growth at 2 days           Imaging Results (Last 24 Hours)     ** No results found for the last 24 hours. **               I reviewed the patient's new clinical results.    Medication Review:   Scheduled Meds:allopurinol, 300 mg, Oral, Daily  colchicine, 0.6 mg, Oral, BID  lisinopril, 20 mg, Oral, Q24H  pantoprazole, 40 mg, Oral, Q AM  predniSONE, 20 mg, Oral, Daily With Breakfast  sodium chloride, 3 mL, Intravenous, Q12H      Continuous Infusions:sodium chloride, 30 mL/hr, Last Rate: 30 mL/hr (10/04/22 0932)      PRN Meds:.•  acetaminophen  •  acetaminophen  •  HYDROcodone-acetaminophen  •  melatonin  •  Morphine  •  ondansetron **OR** ondansetron  •  [COMPLETED] Insert peripheral IV **AND** sodium chloride  •  sodium chloride     Interval History:    Assessment & Plan      Edema of right upper extremity- upon assessment pt reports the edema and pain has improved since steroids  Ankle pain  HTN  Functional mobility impairements  Gout       DVT prophylaxis- SCD's  GI prophylaxis- Protonix       Plan for disposition:inpt rehab    BRENNA Boyle  10/05/22  11:37 EDT

## 2022-10-05 NOTE — PAYOR COMM NOTE
"AUTHORIZATION PENDING:   PLEASE CALL OR FAX DETERMINATION TO CONTACT BELOW. THANK YOU.        Deloris Goins RN MSN  /UR  [facility]  [phone] office  [phone] fax  [email or web address]    [facility]  NPI: [id number]  Tax: [phone]            Jesus Coates (67 y.o. Male)             Date of Birth   1955    Social Security Number       Address   [address] [place] [address] [address] [place] [address]    Home Phone   [phone]    MRN   [id number]       Buddhist   None    Marital Status                               Admission Date   10/2/22    Admission Type   Emergency    Admitting Provider   Cristiane Mackay MD    Attending Provider   Cristiane Mackay MD    Department, Room/Bed   [facility] 3C MEDICAL INPATIENT, 358/1       Discharge Date       Discharge Disposition       Discharge Destination                               Attending Provider: Cristiane Mackay MD    Allergies: No Known Allergies    Isolation: None   Infection: None   Code Status: CPR   Advance Care Planning Activity    Ht: 182.9 cm (72\")   Wt: 114 kg (250 lb 14.1 oz)    Admission Cmt: None   Principal Problem: None                Active Insurance as of 10/2/2022     Primary Coverage     Payor Plan Insurance Group Employer/Plan Group    R [id number]     Payor Plan Address Payor Plan Phone Number Payor Plan Fax Number Effective Dates    [address] [address] [phone]  1/1/2016 - None Entered    [facility] [id number]       Subscriber Name Subscriber Birth Date Member ID       JESUS COATES 1955 [id number]                 Emergency Contacts      (Rel.) Home Phone Work Phone Mobile Phone    Moris Coates (Sister) -- -- [phone]        10/05/22 1316  Inpatient Admission  Once     Completed     Level of Care: Med/Surg    Diagnosis: Edema of right upper extremity [1950058]    Admitting Physician: CRISTIANE MACKAY [[id number]]    Attending Physician: CRISTIANE MACKAY [[id number]]  "   Certification: I Certify That Inpatient Hospital Services Are Medically Necessary For Greater Than 2 Midnights        10/05/22 1315   10/02/22 1833  Initiate Observation Status  Once     Completed     Level of Care: Med/Surg    Diagnosis: Edema of right upper extremity [1174655]    Admitting Physician: CRISTIANE ROBISON [[id number]]    Attending Physician: CRISTIANE ROBISON [[id number]]                    History & Physical      Leyla Mendez APRN at 10/03/22 0131     Attestation signed by Cristiane Robison MD at 10/03/22 1301    I have reviewed this documentation and agree.  Reviewed and examined patient, reviewed test results and directed plan of care.  He is a pleasant 67-year-old man with a long history of gout with frequent flares.  He presented with bilateral ankle pain and right wrist pain.  He was unable to ambulate at home.  Symptoms have improved since admission which he relates to steroid therapy.  Inflammatory markers are markedly elevated.  Evaluation for possible tick borne disease is pending.  We will need to increase home dose of allopurinol.  Need to consider discontinuation of hydrochlorothiazide.                      Patient Care Team:  Chloe Lopez APRN as PCP - General (Nurse Practitioner)    Chief complaint Right arm swelling and pain, right low back pain, bilateral ankle pain    Subjective     Patient is a 67 y.o. male who presents with right arm swelling and pain, right lower back pain, and bilateral ankle pain, right side worse than left. Patient states he has a history of gout and believes this to be a flare up. On allopurinol. Had a steroid injection in the left foot a few days ago with Dr. Parra which helped the left foot symptoms. The right ankle bennett been worse today, that the patient states he is unable to bear weight. Patient lives at home alone.       Onset of symptoms was Going on for a while      Review of Systems   Constitutional: Positive for activity change. Negative for fever.   HENT: Negative.     Eyes: Negative.    Respiratory: Negative.    Cardiovascular: Negative.    Gastrointestinal: Negative.    Endocrine: Negative.    Genitourinary: Negative.    Musculoskeletal: Positive for back pain, gait problem and joint swelling.   Skin: Negative.    Allergic/Immunologic: Negative.    Neurological: Negative for dizziness.   Psychiatric/Behavioral: Negative.           History  Past Medical History:   Diagnosis Date   • Gout      Past Surgical History:   Procedure Laterality Date   • HERNIA REPAIR       Family History   Problem Relation Age of Onset   • Hypertension Mother    • Heart disease Mother    • Cancer Father      Social History     Tobacco Use   • Smoking status: Never Smoker   • Smokeless tobacco: Never Used   Vaping Use   • Vaping Use: Never used   Substance Use Topics   • Alcohol use: Yes     Comment: occasional   • Drug use: Never     Medications Prior to Admission   Medication Sig Dispense Refill Last Dose   • allopurinol (ZYLOPRIM) 100 MG tablet Take 100 mg by mouth Daily.      • bumetanide (BUMEX) 1 MG tablet Take 1 mg by mouth Daily.      • colchicine 0.6 MG tablet Take 0.6 mg by mouth 2 (Two) Times a Day.      • lisinopril-hydrochlorothiazide (PRINZIDE,ZESTORETIC) 20-12.5 MG per tablet Take 2 tablets by mouth Daily.      • multivitamin with minerals tablet tablet Take 1 tablet by mouth Daily.      • omeprazole (priLOSEC) 20 MG capsule Take 20 mg by mouth Daily.        Allergies:  Patient has no known allergies.    Objective     Vital Signs  Temp:  [98 °F (36.7 °C)-98.8 °F (37.1 °C)] 98.3 °F (36.8 °C)  Heart Rate:  [71-86] 84  Resp:  [16-20] 16  BP: (110-140)/(69-87) 110/69     Physical Exam:      General Appearance:    Alert, cooperative, in no acute distress   Head:    Normocephalic, without obvious abnormality, atraumatic   Eyes:            Lids and lashes normal, conjunctivae and sclerae normal, no   icterus, no pallor, corneas clear, PERRLA   Ears:    Ears appear intact with no abnormalities  noted   Throat:   No oral lesions, no thrush, oral mucosa moist   Neck:   No adenopathy, supple, trachea midline, no thyromegaly, no   carotid bruit, no JVD   Lungs:     Clear to auscultation,respirations regular, even and                  unlabored    Heart:    Regular rhythm and normal rate, normal S1 and S2, no            murmur, no gallop, no rub, no click   Chest Wall:    No abnormalities observed   Abdomen:     Normal bowel sounds, no masses, no organomegaly, soft        non-tender, non-distended, no guarding, no rebound                tenderness   Extremities:   2+ Edema to right upper extremity, +1 edema to bilateral ankles   Pulses:   Pulses palpable and equal bilaterally   Skin:   No bleeding, bruising or rash   Lymph nodes:   No palpable adenopathy   Neurologic:   Cranial nerves 2 - 12 grossly intact, sensation intact, DTR       present and equal bilaterally       Results Review:     Imaging Results (Last 24 Hours)     Procedure Component Value Units Date/Time    XR Chest 1 View [[id number]] Collected: 10/02/22 1624     Updated: 10/02/22 1627    Narrative:      XR CHEST 1 VW-     Date of Exam: 10/2/2022 3:57 PM     Indication: systemic inflammatory response; R60.0-Localized edema;  M25.571-Pain in right ankle and joints of right foot.     Comparison Exams: PET/CT from 09/21/2022     Technique: Single AP chest radiograph     FINDINGS:  The lungs are clear. The heart and mediastinal contours appear normal.  The pulmonary vasculature appears normal. The osseous structures appear  intact.       Impression:      No acute cardiopulmonary process identified.     Electronically Signed By-Dieter Yip MD On:10/2/2022 4:25 PM  This report was finalized on [id number] by  Dieter Yip MD.    XR Forearm 2 View Right [[id number]] Collected: 10/02/22 1118     Updated: 10/02/22 1121    Narrative:      DATE OF EXAM:  10/2/2022 11:05 AM     PROCEDURE:  XR ELBOW 3+ VW RIGHT-, XR FOREARM 2 VW RIGHT-      INDICATIONS:  Struck right elbow had pain and swelling ever since     COMPARISON:  No Comparisons Available     TECHNIQUE:   A minimum of three radiologic views of the right elbow were obtained. 3  radiographs of the right forearm were also obtained.        FINDINGS:  No acute fracture is identified. No focal osseous abnormality is  identified. The joints appear congruent. The soft tissues are  unremarkable.        Impression:      No acute osseous process identified.     Electronically Signed By-Dieter Yip MD On:10/2/2022 11:19 AM  This report was finalized on [id number] by  Dieter Yip MD.    XR Elbow 3+ View Right [[id number]] Collected: 10/02/22 1118     Updated: 10/02/22 1121    Narrative:      DATE OF EXAM:  10/2/2022 11:05 AM     PROCEDURE:  XR ELBOW 3+ VW RIGHT-, XR FOREARM 2 VW RIGHT-     INDICATIONS:  Struck right elbow had pain and swelling ever since     COMPARISON:  No Comparisons Available     TECHNIQUE:   A minimum of three radiologic views of the right elbow were obtained. 3  radiographs of the right forearm were also obtained.        FINDINGS:  No acute fracture is identified. No focal osseous abnormality is  identified. The joints appear congruent. The soft tissues are  unremarkable.        Impression:      No acute osseous process identified.     Electronically Signed By-Dieter Yip MD On:10/2/2022 11:19 AM  This report was finalized on [id number] by  Dieter Yip MD.    XR Ankle 3+ View Right [[id number]] Collected: 10/02/22 1114     Updated: 10/02/22 1120    Narrative:      XR ANKLE 3+ VW RIGHT-     Date of Exam: 10/2/2022 11:05 AM     Indication: pain over lateral mal.     Comparison Exams: None available.     Technique: 3 radiographs of the right ankle     FINDINGS:  No acute fracture is identified. No focal osseous abnormality is  identified. The mortise is congruent. The talar dome appears intact. The  soft tissues are unremarkable. There is a small enthesophyte  along the  posterior calcaneus.       Impression:      1.     No acute osseous process identified.  2.     Mild degenerative changes as described above.     Electronically Signed By-Dieter Yip MD On:10/2/2022 11:18 AM  This report was finalized on [id number] by  Dieter Yip MD.           Lab Results (last 24 hours)     Procedure Component Value Units Date/Time    Basic Metabolic Panel [[id number]]  (Abnormal) Collected: 10/02/22 1106    Specimen: Blood Updated: 10/02/22 1126     Glucose 120 mg/dL      BUN 18 mg/dL      Creatinine 1.27 mg/dL      Sodium 137 mmol/L      Potassium 4.2 mmol/L      Comment: Slight hemolysis detected by analyzer. Results may be affected.        Chloride 97 mmol/L      CO2 28.0 mmol/L      Calcium 9.8 mg/dL      BUN/Creatinine Ratio 14.2     Anion Gap 12.0 mmol/L      eGFR 61.2 mL/min/1.73      Comment: National Kidney Foundation and American Society of Nephrology (ASN) Task Force recommended calculation based on the Chronic Kidney Disease Epidemiology Collaboration (CKD-EPI) equation refit without adjustment for race.       Narrative:      GFR Normal >60  Chronic Kidney Disease <60  Kidney Failure <15      Manual Differential [[id number]]  (Abnormal) Collected: 10/02/22 1026    Specimen: Blood Updated: 10/02/22 1107     Neutrophil % 80.0 %      Lymphocyte % 4.0 %      Monocyte % 14.0 %      Bands %  1.0 %      Metamyelocyte % 1.0 %      Neutrophils Absolute 10.77 10*3/mm3      Lymphocytes Absolute 0.53 10*3/mm3      Monocytes Absolute 1.86 10*3/mm3      RBC Morphology Normal     WBC Morphology Normal     Platelet Morphology Normal    CBC & Differential [[id number]]  (Abnormal) Collected: 10/02/22 1026    Specimen: Blood Updated: 10/02/22 1107    Narrative:      The following orders were created for panel order CBC & Differential.  Procedure                               Abnormality         Status                     ---------                               -----------          ------                     CBC Auto Differential[[id number]]        Abnormal            Final result               Scan Slide[[id number]]                                       Final result                 Please view results for these tests on the individual orders.    CBC Auto Differential [[id number]]  (Abnormal) Collected: 10/02/22 1026    Specimen: Blood Updated: 10/02/22 1107     WBC 13.30 10*3/mm3      RBC 3.85 10*6/mm3      Hemoglobin 11.6 g/dL      Hematocrit 35.8 %      MCV 92.9 fL      MCH 30.0 pg      MCHC 32.3 g/dL      RDW 13.9 %      RDW-SD 44.6 fl      MPV 8.9 fL      Platelets 256 10*3/mm3     Narrative:      The previously reported component NRBC is no longer being reported. Previous result was 0.0 /100 WBC (Reference Range: 0.0-0.2 /100 WBC) on 10/2/2022 at 1032 EDT.    Scan Slide [[id number]] Collected: 10/02/22 1026    Specimen: Blood Updated: 10/02/22 1107     Scan Slide --     Comment: See Manual Differential Results              I reviewed the patient's new clinical results.    Assessment & Plan     Edema of right upper extremity- upon assessment pt reports the edema and pain has improved since steroids. Able to move right arm now, previously was not able to  Ankle pain    Patient lives home alone and is unable to bear weight to walk. Admitted for PT evaluation and further workup, appears to be a gout flare up.     DVT prophylaxis- SCD's  GI prophylaxis- Protonix    I discussed the patient's findings and my recommendations with patient.     BRENNA Steele  10/03/22  01:31 EDT        Electronically signed by Candida Robison MD at 10/03/22 1301          Emergency Department Notes      Tommie Sifuentes MD at 10/02/22 1246          Subjective   History of Present Illness  History Provided By: Patient    Chief Complaint: Right arm swelling and pain, right low back pain, bilateral ankle pain right worse than left.  Has history of gout.  On allopurinol.  Had steroid injection in left foot a few days  ago with Dr. Parra which helped left foot and ankle symptoms.  Right ankle been getting worse especially worse today.  States it hurts too much to bear weight and with the pain and swelling in his right arm he is unable to get up.  Lives alone at home.  Onset: Going on for a while  Timing: Worse this morning  Location: Right upper extremity, right ankle, right low back  Quality: Pain  Severity: Moderate  Modifying Factors: None    Other: Patient hit elbow on microwave few days ago and had increasing swelling.  Swelling all distal to his elbow but goes all the way down to his hands.  States it is actually significant better today than it was previously.  Also having bilateral ankle pain but significantly worse on right.  States it feels like previous gout flares.  Has had no fevers or chills.    Review of Systems   Constitutional: Negative for chills and fever.   Respiratory: Negative for shortness of breath.    Cardiovascular: Negative for chest pain.   Gastrointestinal: Negative for abdominal pain.   Musculoskeletal: Positive for arthralgias and back pain.   All other systems reviewed and are negative.      Past Medical History:   Diagnosis Date   • Gout        No Known Allergies    Past Surgical History:   Procedure Laterality Date   • HERNIA REPAIR         Family History   Problem Relation Age of Onset   • Hypertension Mother    • Heart disease Mother    • Cancer Father        Social History     Socioeconomic History   • Marital status:    Tobacco Use   • Smoking status: Never Smoker   • Smokeless tobacco: Never Used   Vaping Use   • Vaping Use: Never used   Substance and Sexual Activity   • Alcohol use: Yes     Comment: occasional   • Drug use: Never   • Sexual activity: Defer           Objective   Physical Exam  Constitutional:  No acute distress.  Head:  Atraumatic.  Normocephalic.   Eyes:  No scleral icterus. Normal conjunctiva  ENT:  Moist mucosa.  No nasal discharge present.  Cardiovascular:  Well  "perfused.  Equal pulses.  Regular rate.  Normal capillary refill.    Pulmonary/Chest:  No respiratory distress.  Airway patent.  No tachypnea.  No accessory muscle usage.  Auscultation bilateral  Abdominal:  Non-distended. Non-tender.   Back: Right lumbar paraspinal tenderness to palpation with palpable muscle spasm  Extremities: 2+ pitting edema to right upper extremity, strength and sensation intact.  2+ radial pulses.  Swelling to bilateral ankles right worse than left.  Diffuse tenderness to palpation over bilateral joint lines without point tenderness palpation.  No warmth.  No overlying skin changes.  2+ PT and DP pulses bilateral lower extremities.  Skin:  Warm, dry  Neurological:  Alert, awake, and appropriate.  Normal speech.      Procedures          ED Course      /77   Pulse 74   Temp 98.8 °F (37.1 °C)   Resp 16   Ht 182.9 cm (72\")   Wt 97.1 kg (214 lb)   SpO2 97%   BMI 29.02 kg/m²   Labs Reviewed   BASIC METABOLIC PANEL - Abnormal; Notable for the following components:       Result Value    Glucose 120 (*)     Chloride 97 (*)     All other components within normal limits    Narrative:     GFR Normal >60  Chronic Kidney Disease <60  Kidney Failure <15     CBC WITH AUTO DIFFERENTIAL - Abnormal; Notable for the following components:    WBC 13.30 (*)     RBC 3.85 (*)     Hemoglobin 11.6 (*)     Hematocrit 35.8 (*)     All other components within normal limits    Narrative:     The previously reported component NRBC is no longer being reported. Previous result was 0.0 /100 WBC (Reference Range: 0.0-0.2 /100 WBC) on 10/2/2022 at 1032 EDT.   MANUAL DIFFERENTIAL - Abnormal; Notable for the following components:    Neutrophil % 80.0 (*)     Lymphocyte % 4.0 (*)     Monocyte % 14.0 (*)     Metamyelocyte % 1.0 (*)     Neutrophils Absolute 10.77 (*)     Lymphocytes Absolute 0.53 (*)     Monocytes Absolute 1.86 (*)     All other components within normal limits   SCAN SLIDE   CBC AND DIFFERENTIAL    " Narrative:     The following orders were created for panel order CBC & Differential.  Procedure                               Abnormality         Status                     ---------                               -----------         ------                     CBC Auto Differential[[id number]]        Abnormal            Final result               Scan Slide[[id number]]                                       Final result                 Please view results for these tests on the individual orders.     Medications   sodium chloride 0.9 % flush 10 mL (has no administration in time range)   HYDROcodone-acetaminophen (NORCO) 5-325 MG per tablet 1 tablet (1 tablet Oral Given 10/2/22 1029)   cyclobenzaprine (FLEXERIL) tablet 10 mg (10 mg Oral Given 10/2/22 1029)   methylPREDNISolone sodium succinate (SOLU-Medrol) injection 125 mg (125 mg Intravenous Given 10/2/22 1241)     XR Elbow 3+ View Right    Result Date: 10/2/2022  No acute osseous process identified.  Electronically Signed By-Dieter Yip MD On:10/2/2022 11:19 AM This report was finalized on 20221002111923 by  Dieter Yip MD.    XR Forearm 2 View Right    Result Date: 10/2/2022  No acute osseous process identified.  Electronically Signed By-Dieter Yip MD On:10/2/2022 11:19 AM This report was finalized on 20221002111923 by  Dieter Yip MD.    XR Ankle 3+ View Right    Result Date: 10/2/2022  1.     No acute osseous process identified. 2.     Mild degenerative changes as described above.  Electronically Signed ByFeliz Yip MD On:10/2/2022 11:18 AM This report was finalized on [id number] by  Dieter Yip MD.                                         MDM  Differential Dx (Includes but not limited to): Gout flare, DVT of upper extremity, septic arthritis, osteoarthritis, fracture  Medical Records Reviewed: Dr. Parra recent note that showed drainage of cyst with steroid injection  Labs: Elevated white count, suspect this is secondary to  recent steroid injection as patient not having any fevers or other infectious symptoms  Imaging: X-rays negative for acute findings and ultrasound negative for DVT  Telemetry: Not applicable  Discussion: Suspect this is due to to gout flare.  Believe elevated white blood cell count secondary to steroid injection.  Patient unable to ambulate.  Lives alone.  Will need admission for PT eval and further work-up and evaluation.  Accepted by Dr. August.    Final diagnoses:   Edema of right upper extremity   Right ankle pain, unspecified chronicity       ED Disposition  ED Disposition     ED Disposition   Decision to Admit    Condition   --    Comment   Level of Care: Med/Surg [1]   Admitting Physician: CRISTIANE ROBISON [[id number]]   Attending Physician: CRISTIANE ROBISON [[id number]]               No follow-up provider specified.       Medication List      No changes were made to your prescriptions during this visit.          Tommie Sifuentes MD  10/02/22 1252      Electronically signed by Tommie Sifuentes MD at 10/02/22 1252     Cleveland Barbosa RN at 10/02/22 1320          Nursing report ED to floor  [facility]  67 y.o.  male    HPI:   Chief Complaint   Patient presents with   • Arm Swelling       Admitting doctor:   Cristiane Robison MD    Admitting diagnosis:   The primary encounter diagnosis was Edema of right upper extremity. A diagnosis of Right ankle pain, unspecified chronicity was also pertinent to this visit.    Code status:   Current Code Status     Date Active Code Status Order ID Comments User Context       Not on file    Advance Care Planning Activity          Allergies:   Patient has no known allergies.    Isolation:  No active isolations     Fall Risk:  Fall Risk Assessment was completed, and patient is at low risk for falls.   Predictive Model Details         3 (Low) Factor Value    Calculated 10/2/2022 10:06 Age 67    Risk of Fall Model Musculoskeletal Assessment WDL     Skin Assessment WDL     Magnesium not on file     Drug  Use No     Shiva Scale not on file     Financial Class Private Insurance     Peripheral Vascular Assessment WDL     Calcium not on file     Sex Male     Gastrointestinal Assessment WDL     Diastolic BP 87     Albumin not on file     Cardiac Assessment WDL     Respiratory Rate 16     Total Bilirubin not on file     Chloride not on file     Potassium not on file     Days after Admission 0.013     Creatinine not on file     ALT not on file         Weight:       10/02/22  0947   Weight: 97.1 kg (214 lb)       Intake and Output  No intake or output data in the 24 hours ending 10/02/22 1320    Diet:        Most recent vitals:   Vitals:    10/02/22 1000 10/02/22 1100 10/02/22 1201 10/02/22 1301   BP: 140/77  130/77 131/74   Pulse: 80 82 74 71   Resp:  16 16 16   Temp:       SpO2: 97% 98% 97% 98%   Weight:       Height:           Active LDAs/IV Access:   Lines, Drains & Airways     Active LDAs     Name Placement date Placement time Site Days    Peripheral IV 10/02/22 1026 Left Antecubital 10/02/22  1026  Antecubital  less than 1                Skin Condition:   Skin Assessments (last day)     None           Labs (abnormal labs have a star):   Labs Reviewed   BASIC METABOLIC PANEL - Abnormal; Notable for the following components:       Result Value    Glucose 120 (*)     Chloride 97 (*)     All other components within normal limits    Narrative:     GFR Normal >60  Chronic Kidney Disease <60  Kidney Failure <15     CBC WITH AUTO DIFFERENTIAL - Abnormal; Notable for the following components:    WBC 13.30 (*)     RBC 3.85 (*)     Hemoglobin 11.6 (*)     Hematocrit 35.8 (*)     All other components within normal limits    Narrative:     The previously reported component NRBC is no longer being reported. Previous result was 0.0 /100 WBC (Reference Range: 0.0-0.2 /100 WBC) on 10/2/2022 at 1032 EDT.   MANUAL DIFFERENTIAL - Abnormal; Notable for the following components:    Neutrophil % 80.0 (*)     Lymphocyte % 4.0 (*)      Monocyte % 14.0 (*)     Metamyelocyte % 1.0 (*)     Neutrophils Absolute 10.77 (*)     Lymphocytes Absolute 0.53 (*)     Monocytes Absolute 1.86 (*)     All other components within normal limits   SCAN SLIDE   CBC AND DIFFERENTIAL    Narrative:     The following orders were created for panel order CBC & Differential.  Procedure                               Abnormality         Status                     ---------                               -----------         ------                     CBC Auto Differential[[id number]]        Abnormal            Final result               Scan Slide[[id number]]                                       Final result                 Please view results for these tests on the individual orders.       LOC: Person, Place, Time and Situation    Telemetry:  Med/Surg    Cardiac Monitoring Ordered: no    EKG:   No orders to display       Medications Given in the ED:   Medications   sodium chloride 0.9 % flush 10 mL (has no administration in time range)   HYDROcodone-acetaminophen (NORCO) 5-325 MG per tablet 1 tablet (1 tablet Oral Given 10/2/22 1029)   cyclobenzaprine (FLEXERIL) tablet 10 mg (10 mg Oral Given 10/2/22 1029)   methylPREDNISolone sodium succinate (SOLU-Medrol) injection 125 mg (125 mg Intravenous Given 10/2/22 1241)       Imaging results:  XR Elbow 3+ View Right    Result Date: 10/2/2022  No acute osseous process identified.  Electronically Signed ByFeliz Yip MD On:10/2/2022 11:19 AM This report was finalized on [id number] by  Dieter Yip MD.    XR Forearm 2 View Right    Result Date: 10/2/2022  No acute osseous process identified.  Electronically Signed ByFeliz iYp MD On:10/2/2022 11:19 AM This report was finalized on [id number] by  Dieter Yip MD.    XR Ankle 3+ View Right    Result Date: 10/2/2022  1.     No acute osseous process identified. 2.     Mild degenerative changes as described above.  Electronically Signed ByFeliz Yip MD  On:10/2/2022 11:18 AM This report was finalized on [id number] by  Dieter Yip MD.      Social issues:   Social History     Socioeconomic History   • Marital status:    Tobacco Use   • Smoking status: Never Smoker   • Smokeless tobacco: Never Used   Vaping Use   • Vaping Use: Never used   Substance and Sexual Activity   • Alcohol use: Yes     Comment: occasional   • Drug use: Never   • Sexual activity: Defer       NIH Stroke Scale:  Interval: (not recorded)  1a. Level of Consciousness: (not recorded)  1b. LOC Questions: (not recorded)  1c. LOC Commands: (not recorded)  2. Best Gaze: (not recorded)  3. Visual: (not recorded)  4. Facial Palsy: (not recorded)  5a. Motor Arm, Left: (not recorded)  5b. Motor Arm, Right: (not recorded)  6a. Motor Leg, Left: (not recorded)  6b. Motor Leg, Right: (not recorded)  7. Limb Ataxia: (not recorded)  8. Sensory: (not recorded)  9. Best Language: (not recorded)  10. Dysarthria: (not recorded)  11. Extinction and Inattention (formerly Neglect): (not recorded)    Total (NIH Stroke Scale): (not recorded)     Additional notable assessment information:     Nursing report ED to floor:  Mary Cullen RN   10/02/22 13:20 EDT     Electronically signed by Cleveland Barbosa RN at 10/02/22 1320          Physician Progress Notes (all)      Amber Patterson APRN at 10/05/22 1137               LOS: 1 day   Patient Care Team:  Chloe Lopez APRN as PCP - General (Nurse Practitioner)    Subjective     Continues to improve with swelling and movement also has bilateral ankle tenderness    Review of Systems   Constitutional: Positive for activity change.   HENT: Negative.    Respiratory: Negative.    Cardiovascular: Negative.    Gastrointestinal: Negative.    Genitourinary: Negative.    Musculoskeletal: Positive for gait problem.   Neurological: Positive for weakness.           Objective     Vital Signs  Temp:  [97.9 °F (36.6 °C)-98 °F (36.7 °C)] 97.9 °F (36.6 °C)  Heart  Rate:  [67-94] 67  Resp:  [18] 18  BP: (132-146)/(78-86) 137/81      Physical Exam  Vitals reviewed.   Constitutional:       Appearance: He is ill-appearing.   HENT:      Head: Normocephalic and atraumatic.      Nose: Nose normal.      Mouth/Throat:      Mouth: Mucous membranes are moist.   Eyes:      General:         Right eye: No discharge.         Left eye: No discharge.   Cardiovascular:      Rate and Rhythm: Normal rate and regular rhythm.      Pulses: Normal pulses.      Heart sounds: Normal heart sounds.   Pulmonary:      Effort: Pulmonary effort is normal.      Breath sounds: Normal breath sounds.   Abdominal:      General: Bowel sounds are normal.      Palpations: Abdomen is soft.   Musculoskeletal:         General: Swelling and tenderness present.      Cervical back: Normal range of motion.   Skin:     General: Skin is warm and dry.   Neurological:      Mental Status: He is alert and oriented to person, place, and time.   Psychiatric:         Behavior: Behavior normal.              Results Review:    Lab Results (last 24 hours)     Procedure Component Value Units Date/Time    Basic Metabolic Panel [[id number]]  (Abnormal) Collected: 10/05/22 0514    Specimen: Blood Updated: 10/05/22 0604     Glucose 135 mg/dL      BUN 29 mg/dL      Creatinine 0.91 mg/dL      Sodium 135 mmol/L      Potassium 5.1 mmol/L      Chloride 102 mmol/L      CO2 25.0 mmol/L      Calcium 9.3 mg/dL      BUN/Creatinine Ratio 31.9     Anion Gap 8.0 mmol/L      eGFR 91.2 mL/min/1.73      Comment: National Kidney Foundation and American Society of Nephrology (ASN) Task Force recommended calculation based on the Chronic Kidney Disease Epidemiology Collaboration (CKD-EPI) equation refit without adjustment for race.       Narrative:      GFR Normal >60  Chronic Kidney Disease <60  Kidney Failure <15      CBC & Differential [[id number]]  (Abnormal) Collected: 10/05/22 0514    Specimen: Blood Updated: 10/05/22 0548    Narrative:      The  following orders were created for panel order CBC & Differential.  Procedure                               Abnormality         Status                     ---------                               -----------         ------                     CBC Auto Differential[[id number]]        Abnormal            Final result                 Please view results for these tests on the individual orders.    CBC Auto Differential [[id number]]  (Abnormal) Collected: 10/05/22 0514    Specimen: Blood Updated: 10/05/22 0548     WBC 13.10 10*3/mm3      RBC 3.88 10*6/mm3      Hemoglobin 11.5 g/dL      Hematocrit 35.4 %      MCV 91.3 fL      MCH 29.8 pg      MCHC 32.6 g/dL      RDW 14.4 %      RDW-SD 46.4 fl      MPV 8.3 fL      Platelets 298 10*3/mm3      Neutrophil % 91.9 %      Lymphocyte % 3.6 %      Monocyte % 4.4 %      Eosinophil % 0.0 %      Basophil % 0.1 %      Neutrophils, Absolute 12.10 10*3/mm3      Lymphocytes, Absolute 0.50 10*3/mm3      Monocytes, Absolute 0.60 10*3/mm3      Eosinophils, Absolute 0.00 10*3/mm3      Basophils, Absolute 0.00 10*3/mm3      nRBC 0.0 /100 WBC     Blood Culture - Blood, Hand, Left [[id number]]  (Normal) Collected: 10/03/22 0249    Specimen: Blood from Hand, Left Updated: 10/05/22 0345     Blood Culture No growth at 2 days    Blood Culture - Blood, Hand, Right [[id number]]  (Normal) Collected: 10/03/22 0249    Specimen: Blood from Hand, Right Updated: 10/05/22 0345     Blood Culture No growth at 2 days           Imaging Results (Last 24 Hours)     ** No results found for the last 24 hours. **               I reviewed the patient's new clinical results.    Medication Review:   Scheduled Meds:allopurinol, 300 mg, Oral, Daily  colchicine, 0.6 mg, Oral, BID  lisinopril, 20 mg, Oral, Q24H  pantoprazole, 40 mg, Oral, Q AM  predniSONE, 20 mg, Oral, Daily With Breakfast  sodium chloride, 3 mL, Intravenous, Q12H      Continuous Infusions:sodium chloride, 30 mL/hr, Last Rate: 30 mL/hr (10/04/22 0932)      PRN  Meds:.•  acetaminophen  •  acetaminophen  •  HYDROcodone-acetaminophen  •  melatonin  •  Morphine  •  ondansetron **OR** ondansetron  •  [COMPLETED] Insert peripheral IV **AND** sodium chloride  •  sodium chloride     Interval History:    Assessment & Plan      Edema of right upper extremity- upon assessment pt reports the edema and pain has improved since steroids  Ankle pain  HTN  Functional mobility impairements  Gout       DVT prophylaxis- SCD's  GI prophylaxis- Protonix       Plan for disposition:inpt rehab    BRENNA Boyle  10/05/22  11:37 EDT          Electronically signed by Amber Patterson APRN at 10/05/22 1141     Amber Patterson APRN at 10/04/22 0940     Attestation signed by Candida Robison MD at 10/04/22 1150    I have reviewed this documentation and agree.  I have interviewed and examined patient, reviewed test results and directed the plan of care.  He has had significant clinical improvement in right wrist redness, pain, swelling.  He continues to have right ankle pain but there is no visible redness or swelling.  He is working with occupational therapy and physical therapy.  Overall he is improving but still is not at baseline and we are pursuing skilled rehab.                       LOS: 1 day   Patient Care Team:  Chloe Lopez APRN as PCP - General (Nurse Practitioner)    Subjective     Swelling and pain have improved    Review of Systems   Constitutional: Positive for activity change.   HENT: Negative.    Respiratory: Negative.    Cardiovascular: Negative.    Gastrointestinal: Negative.    Genitourinary: Negative.    Musculoskeletal: Positive for gait problem.   Neurological: Positive for weakness.           Objective     Vital Signs  Temp:  [97.2 °F (36.2 °C)-98 °F (36.7 °C)] 98 °F (36.7 °C)  Heart Rate:  [61-93] 93  Resp:  [17-20] 17  BP: (126-145)/(71-83) 126/79      Physical Exam  Vitals reviewed.   Constitutional:       Appearance: He is ill-appearing.   HENT:      Head:  Normocephalic and atraumatic.      Nose: Nose normal.      Mouth/Throat:      Mouth: Mucous membranes are moist.   Eyes:      General:         Right eye: No discharge.         Left eye: No discharge.   Cardiovascular:      Rate and Rhythm: Normal rate and regular rhythm.      Pulses: Normal pulses.      Heart sounds: Normal heart sounds.   Pulmonary:      Effort: Pulmonary effort is normal.      Breath sounds: Normal breath sounds.   Abdominal:      General: Bowel sounds are normal.      Palpations: Abdomen is soft.   Musculoskeletal:         General: Swelling and tenderness present.      Cervical back: Normal range of motion.   Skin:     General: Skin is warm and dry.   Neurological:      Mental Status: He is alert and oriented to person, place, and time.   Psychiatric:         Behavior: Behavior normal.              Results Review:    Lab Results (last 24 hours)     Procedure Component Value Units Date/Time    Blood Culture - Blood, Hand, Left [[id number]]  (Normal) Collected: 10/03/22 0249    Specimen: Blood from Hand, Left Updated: 10/04/22 0347     Blood Culture No growth at 24 hours    Blood Culture - Blood, Hand, Right [[id number]]  (Normal) Collected: 10/03/22 0249    Specimen: Blood from Hand, Right Updated: 10/04/22 0347     Blood Culture No growth at 24 hours    Basic Metabolic Panel [[id number]]  (Abnormal) Collected: 10/04/22 0128    Specimen: Blood Updated: 10/04/22 0335     Glucose 132 mg/dL      BUN 33 mg/dL      Creatinine 1.18 mg/dL      Sodium 137 mmol/L      Potassium 4.7 mmol/L      Chloride 102 mmol/L      CO2 24.0 mmol/L      Calcium 9.6 mg/dL      BUN/Creatinine Ratio 28.0     Anion Gap 11.0 mmol/L      eGFR 66.8 mL/min/1.73      Comment: National Kidney Foundation and American Society of Nephrology (ASN) Task Force recommended calculation based on the Chronic Kidney Disease Epidemiology Collaboration (CKD-EPI) equation refit without adjustment for race.       Narrative:      GFR Normal  >60  Chronic Kidney Disease <60  Kidney Failure <15      CBC & Differential [[id number]]  (Abnormal) Collected: 10/04/22 0128    Specimen: Blood Updated: 10/04/22 0319    Narrative:      The following orders were created for panel order CBC & Differential.  Procedure                               Abnormality         Status                     ---------                               -----------         ------                     CBC Auto Differential[[id number]]        Abnormal            Final result                 Please view results for these tests on the individual orders.    CBC Auto Differential [[id number]]  (Abnormal) Collected: 10/04/22 0128    Specimen: Blood Updated: 10/04/22 0319     WBC 15.60 10*3/mm3      RBC 3.72 10*6/mm3      Hemoglobin 11.3 g/dL      Hematocrit 33.7 %      MCV 90.5 fL      MCH 30.3 pg      MCHC 33.5 g/dL      RDW 14.1 %      RDW-SD 45.1 fl      MPV 9.1 fL      Platelets 254 10*3/mm3      Neutrophil % 91.5 %      Lymphocyte % 2.6 %      Monocyte % 5.7 %      Eosinophil % 0.0 %      Basophil % 0.2 %      Neutrophils, Absolute 14.30 10*3/mm3      Lymphocytes, Absolute 0.40 10*3/mm3      Monocytes, Absolute 0.90 10*3/mm3      Eosinophils, Absolute 0.00 10*3/mm3      Basophils, Absolute 0.00 10*3/mm3      nRBC 0.0 /100 WBC     Uric Acid [[id number]]  (Normal) Collected: 10/03/22 1228    Specimen: Blood Updated: 10/03/22 1328     Uric Acid 7.0 mg/dL     BOONE [[id number]]  (Normal) Collected: 10/03/22 0249    Specimen: Blood Updated: 10/03/22 1157     Antinuclear Antibodies (BOONE) Negative    Tick Panel - Blood, Arm, Left [[id number]] Collected: 10/03/22 0249    Specimen: Blood from Arm, Left Updated: 10/03/22 1059    Narrative:      The following orders were created for panel order Tick Panel - Blood, Arm, Left.  Procedure                               Abnormality         Status                     ---------                               -----------         ------                     Febrile  Antibody Profile[[id number]]                         In process                 E. Chaffeenis-HME (Monoc...[[id number]]                      In process                 Human Granulocytic Ehrli...[[id number]]                      In process                 Lyme Antibody IgG[[id number]]            Normal              Final result                 Please view results for these tests on the individual orders.    Lyme Antibody IgG [[id number]]  (Normal) Collected: 10/03/22 0249    Specimen: Blood from Arm, Left Updated: 10/03/22 1059     Lyme Ab IgG Negative           Imaging Results (Last 24 Hours)     ** No results found for the last 24 hours. **               I reviewed the patient's new clinical results.    Medication Review:   Scheduled Meds:allopurinol, 300 mg, Oral, Daily  colchicine, 0.6 mg, Oral, BID  lisinopril, 20 mg, Oral, Q24H  methylPREDNISolone sodium succinate, 20 mg, Intravenous, Q12H  pantoprazole, 40 mg, Oral, Q AM  sodium chloride, 3 mL, Intravenous, Q12H      Continuous Infusions:sodium chloride, 30 mL/hr, Last Rate: 30 mL/hr (10/04/22 0932)      PRN Meds:.•  acetaminophen  •  acetaminophen  •  HYDROcodone-acetaminophen  •  melatonin  •  Morphine  •  ondansetron **OR** ondansetron  •  [COMPLETED] Insert peripheral IV **AND** sodium chloride  •  sodium chloride     Interval History:    Assessment & Plan     Edema of right upper extremity- upon assessment pt reports the edema and pain has improved since steroids  Ankle pain  HTN  Gout       DVT prophylaxis- SCD's  GI prophylaxis- Protonix       Plan for disposition:inpt rehab    BRENNA Boyle  10/04/22  09:40 EDT          Electronically signed by Candida Robison MD at 10/04/22 2567

## 2022-10-05 NOTE — PLAN OF CARE
Goal Outcome Evaluation:   Patient has required pain medication during shift. Patient is awaiting precert to [place]. Will continue to monitor.

## 2022-10-05 NOTE — PLAN OF CARE
"Goal Outcome Evaluation:        Bed mobility - Min-A supine to sit  Transfers - Min-A and with rolling walker sit to stand x's 4 reps  Ambulation - 34 feet CGA and with rolling walker      Moderate Intensity Therapy recommended post-acute care. This is recommended as therapy feels the patient would require 3-4 days per week and wouldn't tolerate \"3 hour daily\" rehab intensity. SNF rehab would be the preferred choice.  Pt requires no DME at discharge.     Pt desires Skilled Rehab placement at discharge. Pt cooperative; agreeable to therapeutic recommendations and plan of care.    "

## 2022-10-05 NOTE — THERAPY TREATMENT NOTE
"Subjective: Pt agreeable to therapeutic plan of care.    Objective:     Bed mobility - Min-A supine to sit  Transfers - Min-A and with rolling walker sit to stand x's 4 reps  Ambulation - 34 feet CGA and with rolling walker    Vitals: WNL    Pain: 4 VAS bilateral feet and right hand  Education: Provided education on importance of mobility and skilled verbal / tactile cueing throughout intervention.     Assessment: Jesus WONG Jose David presents with functional mobility impairments which indicate the need for skilled intervention. Pt with significantly less pain and swelling.  pt was able to progress with his mobility today.  Tolerating session today without incident. Will continue to follow and progress as tolerated.     Plan/Recommendations:   Moderate Intensity Therapy recommended post-acute care. This is recommended as therapy feels the patient would require 3-4 days per week and wouldn't tolerate \"3 hour daily\" rehab intensity. SNF rehab would be the preferred choice.  Pt requires no DME at discharge.     Pt desires Skilled Rehab placement at discharge. Pt cooperative; agreeable to therapeutic recommendations and plan of care.     Basic Mobility 6-click:  Rollin = Total, A lot = 2, A little = 3; 4 = None  Supine>Sit:   1 = Total, A lot = 2, A little = 3; 4 = None   Sit>Stand with arms:  1 = Total, A lot = 2, A little = 3; 4 = None  Bed>Chair:   1 = Total, A lot = 2, A little = 3; 4 = None  Ambulate in room:  1 = Total, A lot = 2, A little = 3; 4 = None  3-5 Steps with railin = Total, A lot = 2, A little = 3; 4 = None  Score: 16    Modified Katharine: 4 = Moderately severe disability (Unable to attend to own bodily needs without assistance, and unable to walk unassisted)     Post-Tx Position: Up in Chair, Alarms activated and Call light and personal items within reach visitor at bedside  PPE: mask, gloves and eye protection    "

## 2022-10-06 NOTE — PROGRESS NOTES
LOS: 2 days   Patient Care Team:  Chloe Lopez APRN as PCP - General (Nurse Practitioner)    Subjective     Continues to improve with swelling and movement also has bilateral ankle tenderness    Review of Systems   Constitutional: Positive for activity change.   HENT: Negative.    Respiratory: Negative.    Cardiovascular: Negative.    Gastrointestinal: Negative.    Genitourinary: Negative.    Musculoskeletal: Positive for gait problem.   Neurological: Positive for weakness.           Objective     Vital Signs  Temp:  [97.5 °F (36.4 °C)-98.9 °F (37.2 °C)] 98.9 °F (37.2 °C)  Heart Rate:  [] 81  Resp:  [16-19] 17  BP: (111-130)/(70-83) 121/71      Physical Exam  Vitals reviewed.   Constitutional:       Appearance: He is ill-appearing.   HENT:      Head: Normocephalic and atraumatic.      Nose: Nose normal.      Mouth/Throat:      Mouth: Mucous membranes are moist.   Eyes:      General:         Right eye: No discharge.         Left eye: No discharge.   Cardiovascular:      Rate and Rhythm: Normal rate and regular rhythm.      Pulses: Normal pulses.      Heart sounds: Normal heart sounds.   Pulmonary:      Effort: Pulmonary effort is normal.      Breath sounds: Normal breath sounds.   Abdominal:      General: Bowel sounds are normal.      Palpations: Abdomen is soft.   Musculoskeletal:         General: Swelling and tenderness present.      Cervical back: Normal range of motion.   Skin:     General: Skin is warm and dry.   Neurological:      Mental Status: He is alert and oriented to person, place, and time.   Psychiatric:         Behavior: Behavior normal.              Results Review:    Lab Results (last 24 hours)     Procedure Component Value Units Date/Time    Manual Differential [[id number]]  (Abnormal) Collected: 10/06/22 0319    Specimen: Blood Updated: 10/06/22 0534     Neutrophil % 78.0 %      Lymphocyte % 10.0 %      Monocyte % 7.0 %      Bands %  4.0 %      Metamyelocyte % 1.0 %      Neutrophils  Absolute 10.00 10*3/mm3      Lymphocytes Absolute 1.22 10*3/mm3      Monocytes Absolute 0.85 10*3/mm3      RBC Morphology Normal     Toxic Granulation Slight/1+     Large Platelets Slight/1+    CBC & Differential [[id number]]  (Abnormal) Collected: 10/06/22 0319    Specimen: Blood Updated: 10/06/22 0534    Narrative:      The following orders were created for panel order CBC & Differential.  Procedure                               Abnormality         Status                     ---------                               -----------         ------                     CBC Auto Differential[[id number]]        Abnormal            Final result               Scan Slide[[id number]]                                       Final result                 Please view results for these tests on the individual orders.    Scan Slide [[id number]] Collected: 10/06/22 0319    Specimen: Blood Updated: 10/06/22 0534     Scan Slide --     Comment: See Manual Differential Results       CBC Auto Differential [[id number]]  (Abnormal) Collected: 10/06/22 0319    Specimen: Blood Updated: 10/06/22 0534     WBC 12.20 10*3/mm3      RBC 3.72 10*6/mm3      Hemoglobin 11.1 g/dL      Hematocrit 34.7 %      MCV 93.4 fL      MCH 29.9 pg      MCHC 32.1 g/dL      RDW 14.6 %      RDW-SD 48.1 fl      MPV 8.9 fL      Platelets 329 10*3/mm3     Narrative:      The previously reported component NRBC is no longer being reported. Previous result was 0.2 /100 WBC (Reference Range: 0.0-0.2 /100 WBC) on 10/6/2022 at 0453 EDT.    Basic Metabolic Panel [[id number]]  (Abnormal) Collected: 10/06/22 0318    Specimen: Blood Updated: 10/06/22 0506     Glucose 110 mg/dL      BUN 28 mg/dL      Creatinine 1.03 mg/dL      Sodium 135 mmol/L      Potassium 4.8 mmol/L      Comment: Slight hemolysis detected by analyzer. Results may be affected.        Chloride 101 mmol/L      CO2 26.0 mmol/L      Calcium 9.1 mg/dL      BUN/Creatinine Ratio 27.2     Anion Gap 8.0 mmol/L      eGFR 78.6  mL/min/1.73      Comment: National Kidney Foundation and American Society of Nephrology (ASN) Task Force recommended calculation based on the Chronic Kidney Disease Epidemiology Collaboration (CKD-EPI) equation refit without adjustment for race.       Narrative:      GFR Normal >60  Chronic Kidney Disease <60  Kidney Failure <15      Blood Culture - Blood, Hand, Left [[id number]]  (Normal) Collected: 10/03/22 0249    Specimen: Blood from Hand, Left Updated: 10/06/22 0346     Blood Culture No growth at 3 days    Blood Culture - Blood, Hand, Right [[id number]]  (Normal) Collected: 10/03/22 0249    Specimen: Blood from Hand, Right Updated: 10/06/22 0346     Blood Culture No growth at 3 days    Tick Panel - Blood, Arm, Left [[id number]] Collected: 10/03/22 0249    Specimen: Blood from Arm, Left Updated: 10/05/22 1412    Narrative:      The following orders were created for panel order Tick Panel - Blood, Arm, Left.  Procedure                               Abnormality         Status                     ---------                               -----------         ------                     Febrile Antibody Profile[[id number]]                         In process                 E. Chaffeenis-HME (Monoc...[[id number]]                      Final result               Human Granulocytic Ehrli...[[id number]]                      Final result               Lyme Antibody IgG[[id number]]            Normal              Final result                 Please view results for these tests on the individual orders.    Human Granulocytic Karmen-HGE [[id number]] Collected: 10/03/22 0249    Specimen: Blood from Arm, Left Updated: 10/05/22 1412     HGE IgG Titer Negative     Comment: HGE IgG levels are detectable 7 to 10 days post infection and persist  approximately one year.        HGE IgM Titer Negative     Comment: Due to a reagent backorder, this test was performed using a different  assay. The reference interval for this alternate assay is:                                          Negative      <1:64                                         Positive       1:64 or greater  IgM levels usually rise 3 to 5 days post infection and fall to normal  levels in approximately 30 to 60 days.       Narrative:      Performed at:  [address] [place] [address]  [id number]  : Patience Esparza MD, Phone:  [phone]    E. Chaffeenis-HME (Monocytic) [[id number]] Collected: 10/03/22 0249    Specimen: Blood from Arm, Left Updated: 10/05/22 1412     E. chaffeensis (HME) IgG Titer Negative     E. chaffeensis (HME) IgM Titer Negative     Comment: IgG titers if 1:64 or greater indicate exposure or  acute and  convalescent samples showing a four-fold increase, and/or the presence  of IgM indicate recent or current infection.       Narrative:      Performed at:  [facility]  [id number]  : Patience Esparza MD, Phone:  [phone]           Imaging Results (Last 24 Hours)     ** No results found for the last 24 hours. **               I reviewed the patient's new clinical results.    Medication Review:   Scheduled Meds:allopurinol, 300 mg, Oral, Daily  colchicine, 0.6 mg, Oral, BID  lisinopril, 20 mg, Oral, Q24H  pantoprazole, 40 mg, Oral, Q AM  predniSONE, 20 mg, Oral, Daily With Breakfast  sodium chloride, 3 mL, Intravenous, Q12H      Continuous Infusions:   PRN Meds:.•  acetaminophen  •  acetaminophen  •  HYDROcodone-acetaminophen  •  melatonin  •  Morphine  •  ondansetron **OR** ondansetron  •  [COMPLETED] Insert peripheral IV **AND** sodium chloride  •  sodium chloride     Interval History:    Assessment & Plan      Edema of right upper extremity- upon assessment pt reports the edema and pain has improved since steroids  Ankle pain  HTN  Functional mobility impairements  Gout       DVT prophylaxis- SCD's  GI prophylaxis- Protonix       Plan for disposition:in rehab    Amber Patterson,  BRENNA  10/06/22  10:41 EDT

## 2022-10-06 NOTE — PLAN OF CARE
"Pt presents with functional mobility impairments which indicate the need for skilled intervention. Tolerating session today without incident. Pt on room air with external catheter hooked to suction.  Pt required min A with increased time end effort to mobilize BLE off bed.  Min/Mod A with RW x 3 attempts with rocking momentum required.  Pt unable to bear weight through R hand secondary to gout pain and utilized weight-bearing through R elbow on bed rail.  Pt ambulated 5; x 4 with RW with cga/min A.  Pt refusing to allow therapist to disconnect external catheter from suction \"the last time the therapist did so urine went everywhere.\" Will continue to follow and progress as tolerated.                  "

## 2022-10-06 NOTE — CASE MANAGEMENT/SOCIAL WORK
Continued Stay Note  FRANC Anderson     Patient Name: Jesus Main  MRN: [id number]  Today's Date: 10/6/2022    Admit Date: 10/2/2022    Plan: Accepted to [facility] pending precert. Precert started 10/5. PASRR per facility.   Discharge Plan     Row Name 10/06/22 1450       Plan    Plan Accepted to [facility] pending precert. Precert started 10/5. PASRR per facility.    Plan Comments CM checked with facility liaison uJlia regarding precert; reports it is still pending at this time.              Phone communication or documentation only - no physical contact with patient or family.      Megan Naegele, RN      Office Phone: [phone]  Office Cell: [phone]

## 2022-10-06 NOTE — THERAPY TREATMENT NOTE
"Subjective: Pt agreeable to therapeutic plan of care.    Objective:     Bed mobility - Min A with increased time and effort o mobilize BLE off bed  Transfers - Min/Mod A with RW x 3 attempts Pt unable to bear weight through R hand secondary to gout pain and utilized weight-bearing through R elbow on bed rail  Ambulation -  5' x 4 with RW with cga/min A refused to disconnect external catheter from suction.     Vitals: WNL    Pain: 5 VAS B ankles    Education: Provided education on importance of mobility and skilled verbal / tactile cueing throughout intervention.     Assessment: Jesus Main presents with functional mobility impairments which indicate the need for skilled intervention. Tolerating session today without incident. Pt on room air with external catheter hooked to suction.  Pt required min A with increased time end effort to mobilize BLE off bed.  Min/Mod A with RW x 3 attempts with rocking momentum required.  Pt unable to bear weight through R hand secondary to gout pain and utilized weight-bearing through R elbow on bed rail.  Pt ambulated 5; x 4 with RW with cga/min A.  Pt refusing to allow therapist to disconnect external catheter from suction \"the last time the therapist did so urine went everywhere.\" Will continue to follow and progress as tolerated.     Plan/Recommendations:   Moderate Intensity Therapy recommended post-acute care. This is recommended as therapy feels the patient would require 3-4 days per week and wouldn't tolerate \"3 hour daily\" rehab intensity. SNF would be the preferred choice. If the patient does not agree to SNF, arrange HH or OP depending on home bound status. If patient is medically complex, consider LTACH.. Pt requires no DME at discharge.     Pt desires Skilled Rehab placement at discharge. Pt cooperative; agreeable to therapeutic recommendations and plan of care.         Basic Mobility 6-click:  Rollin = Total, A lot = 2, A little = 3; 4 = None  Supine>Sit: "   1 = Total, A lot = 2, A little = 3; 4 = None   Sit>Stand with arms:  1 = Total, A lot = 2, A little = 3; 4 = None  Bed>Chair:   1 = Total, A lot = 2, A little = 3; 4 = None  Ambulate in room:  1 = Total, A lot = 2, A little = 3; 4 = None  3-5 Steps with railin = Total, A lot = 2, A little = 3; 4 = None  Score: 15      Post-Tx Position: Up in Chair, Alarms activated and Call light and personal items within reach  PPE: mask and eye protection

## 2022-10-07 NOTE — CASE MANAGEMENT/SOCIAL WORK
Continued Stay Note  FRANC Anderson     Patient Name: Jesus Main  MRN: [id number]  Today's Date: 10/7/2022    Admit Date: 10/2/2022    Plan: Accepted to [facility] pending precert. Precert started 10/5. PASRR per facility.   Discharge Plan     Row Name 10/07/22 1541       Plan    Plan Accepted to [facility] pending precert. Precert started 10/5. PASRR per facility.    Plan Comments CM confirmed with liaison Julia that precert is still pending at this time. She will contact ED CM or weekend CM if it gets approved.              Phone communication or documentation only - no physical contact with patient or family.      Megan Naegele, RN      Office Phone: [phone]  Office Cell: [phone]

## 2022-10-07 NOTE — PAYOR COMM NOTE
"NOTIFICATION OF CHANGE IN STATUS:        Jesus Coates (67 y.o. Male) 1955  DENIED AUTH # [id number]        IPATIENT WAS CHANGED TO OBSERVATION STATUS ON 10/07/22 1548. THANKS                Deloris Goins RN MSN  /UR  [facility]  [phone] office  [phone] fax  [email or web address]    [facility]  NPI: [id number]  Tax: [phone]          Jesus Coates (67 y.o. Male)             Date of Birth   1955    Social Security Number       Address   [address] [place] [address] [place] [address]    Home Phone   [phone]    MRN   [id number]       Sabianist   None    Marital Status                               Admission Date   10/2/22    Admission Type   Emergency    Admitting Provider   Candida Robison MD    Attending Provider   Candida Robison MD    Department, Room/Bed   [facility] 3C MEDICAL INPATIENT, 358/1       Discharge Date       Discharge Disposition       Discharge Destination                               Attending Provider: Candida Robison MD    Allergies: No Known Allergies    Isolation: None   Infection: None   Code Status: CPR   Advance Care Planning Activity    Ht: 182.9 cm (72\")   Wt: 115 kg (254 lb 6.6 oz)    Admission Cmt: None   Principal Problem: None                Active Insurance as of 10/2/2022     Primary Coverage     Payor Plan Insurance Group Employer/Plan Group    [id number]     Payor Plan Address Payor Plan Phone Number Payor Plan Fax Number Effective Dates     [address] [address] [phone]  1/1/2016 - None Entered    [facility] [id number]       Subscriber Name Subscriber Birth Date Member ID       JESUS COATES 1955 [id number]                 Emergency Contacts      (Rel.) Home Phone Work Phone Mobile Phone    Moris Coates (Sister) -- -- [phone]        10/07/22 1548  Initiate Observation Status  Once     Completed     Level of Care: Med/Surg    Diagnosis: Edema of right upper " extremity [1950058]    Admitting Physician: CRISTIANE MACKAY [[id number]]    Attending Physician: CRISTIANE MACKAY [[id number]]        10/07/22 1548   10/05/22 1316  Inpatient Admission  Once     Completed     Level of Care: Med/Surg    Diagnosis: Edema of right upper extremity [1950058]    Admitting Physician: CRISTIANE MACKAY [[id number]]    Attending Physician: CRISTIANE MACKAY [[id number]]    Certification: I Certify That Inpatient Hospital Services Are Medically Necessary For Greater Than 2 Midnights        10/05/22 1315   10/02/22 1833  Initiate Observation Status  Once     Completed     Level of Care: Med/Surg    Diagnosis: Edema of right upper extremity [1950058]    Admitting Physician: CRISTIANE MACKAY [[id number]]    Attending Physician: CRISTIANE MACKAY [[id number]]

## 2022-10-07 NOTE — PROGRESS NOTES
LOS: 3 days   Patient Care Team:  Chloe Lopez APRN as PCP - General (Nurse Practitioner)    Subjective     Interval History: Stable overnight    Patient Complaints: New complaint of left knee pain and swelling    History taken from: patient    Review of Systems   Constitutional: Positive for activity change. Negative for appetite change, chills, diaphoresis, fatigue, fever and unexpected weight change.   HENT: Negative for hearing loss.    Eyes: Negative for visual disturbance.   Respiratory: Negative for cough, shortness of breath, wheezing and stridor.    Cardiovascular: Negative for chest pain, palpitations and leg swelling.   Gastrointestinal: Negative for abdominal pain, constipation, diarrhea, nausea and vomiting.   Genitourinary: Negative for dysuria.   Musculoskeletal: Positive for arthralgias and gait problem. Negative for back pain, myalgias, neck pain and neck stiffness.   Skin: Negative for rash and wound.   Neurological: Negative for dizziness, tremors, weakness, light-headedness, numbness and headaches.   Psychiatric/Behavioral: Negative for confusion.           Objective     Vital Signs  Temp:  [97.7 °F (36.5 °C)-98.4 °F (36.9 °C)] 97.7 °F (36.5 °C)  Heart Rate:  [] 115  Resp:  [12-20] 20  BP: (100-131)/(64-80) 100/64    Physical Exam:     General Appearance:    Alert, cooperative, in no acute distress,   Head:    Normocephalic, without obvious abnormality, atraumatic   Eyes:            Lids and lashes normal, conjunctivae and sclerae normal, no   icterus, no pallor, corneas clear, PERRLA   Ears:    Ears appear intact with no abnormalities noted   Throat:   No oral lesions, no thrush, oral mucosa moist   Neck:   No adenopathy, supple, trachea midline, no thyromegaly, no   carotid bruit, no JVD   Lungs:     Clear to auscultation,respirations regular, even and                  unlabored    Heart:    Regular rhythm and normal rate, normal S1 and S2, no            murmur, no gallop, no  rub, no click   Chest Wall:    No abnormalities observed   Abdomen:     Normal bowel sounds, no masses, no organomegaly, soft        Non-tender non-distended, no guarding,   Extremities:  Mild effusion left knee, no erythema or limitation of range of motion; improved range of motion in right wrist; resolution of all soft tissue swelling and joint effusions and hands and feet   Pulses:   Pulses palpable and equal bilaterally   Skin:   No bleeding, bruising or rash   Lymph nodes:   No palpable adenopathy   Neurologic:   Cranial nerves 2 - 12 grossly intact, sensation intact, DTR       present and equal bilaterally        Results Review:    Lab Results (last 24 hours)     Procedure Component Value Units Date/Time    Tick Panel - Blood, Arm, Left [[id number]] Collected: 10/03/22 0249    Specimen: Blood from Arm, Left Updated: 10/07/22 1113    Narrative:      The following orders were created for panel order Tick Panel - Blood, Arm, Left.  Procedure                               Abnormality         Status                     ---------                               -----------         ------                     Febrile Antibody Profile[[id number]]                         Final result               E. Chaffeenis-HME (Monoc...[[id number]]                      Final result               Human Granulocytic Ehrli...[[id number]]                      Final result               Lyme Antibody IgG[[id number]]            Normal              Final result                 Please view results for these tests on the individual orders.    Febrile Antibody Profile [[id number]] Collected: 10/03/22 0249    Specimen: Blood from Arm, Left Updated: 10/07/22 1113     Brucella IgG Negative     Comment: This assay detects antibodies to Brucella abortus, melitensis,  and suis.        Brucella IgM Negative     Comment: This assay detects antibodies to Brucella abortus, melitensis,  and suis.        Spotted Fever Group IgG <1:64     Typhus Fever Group IgG  <1:64     Spotted Fever Group IgM <1:64     Typhus Fever Group IgM <1:64    Narrative:      Performed at:  [address]  [id number]  : Patience Esparza MD, Phone:  [phone]    Blood Culture - Blood, Hand, Left [[id number]]  (Normal) Collected: 10/03/22 0249    Specimen: Blood from Hand, Left Updated: 10/07/22 0347     Blood Culture No growth at 4 days    Blood Culture - Blood, Hand, Right [[id number]]  (Normal) Collected: 10/03/22 0249    Specimen: Blood from Hand, Right Updated: 10/07/22 0347     Blood Culture No growth at 4 days           Imaging Results (Last 24 Hours)     ** No results found for the last 24 hours. **               I reviewed the patient's new clinical results.    Medication Review:   Scheduled Meds:allopurinol, 300 mg, Oral, Daily  colchicine, 0.6 mg, Oral, BID  Diclofenac Sodium, 4 g, Topical, 4x Daily  lisinopril, 20 mg, Oral, Q24H  pantoprazole, 40 mg, Oral, Q AM  predniSONE, 20 mg, Oral, Daily With Breakfast  sodium chloride, 3 mL, Intravenous, Q12H      Continuous Infusions:   PRN Meds:.•  acetaminophen  •  acetaminophen  •  HYDROcodone-acetaminophen  •  melatonin  •  Morphine  •  ondansetron **OR** ondansetron  •  [COMPLETED] Insert peripheral IV **AND** sodium chloride  •  sodium chloride     Assessment & Plan       Edema of right upper extremity    Right ankle pain  Right knee pain  Migratory arthralgias -tick panel and BOONE are negative; checking rheumatoid factor;   Acute gouty arthritis -continue steroids, colchicine, allopurinol; recheck uric acid in the morning        Plan for disposition: Ready for discharge to rehab    Candida Robison MD  10/07/22  15:10 EDT

## 2022-10-07 NOTE — PLAN OF CARE
Goal Outcome Evaluation:              Outcome Evaluation: Patient denies pain. Awaiting precert for placement. External catheter in place. No complaints at this time.

## 2022-10-08 NOTE — SIGNIFICANT NOTE
10/08/22 1524   OTHER   Discipline physical therapy assistant   Rehab Time/Intention   Session Not Performed other (see comments)  (PT held this date due to pending orders to r/o L LE DVT. Will continue to follow for PT as appropriate.)   Therapy Assessment/Plan (PT)   Criteria for Skilled Interventions Met (PT) yes   Recommendation   PT - Next Appointment 10/09/22

## 2022-10-08 NOTE — PLAN OF CARE
Goal Outcome Evaluation:  Patient is pleasant. C/O pain in LLE- no pain medication was administered per patient request. Venous Duplex LLE- blood clot up to groin. CTPE pending. No further complaints. Will continue to monitor.

## 2022-10-08 NOTE — PROGRESS NOTES
LOS: 3 days   Patient Care Team:  Chloe Lopez APRN as PCP - General (Nurse Practitioner)    Subjective     Interval History:     Patient Complaints: Left leg pain and swelling    History taken from: patient    Review of Systems   Constitutional: Negative for activity change, appetite change, chills, diaphoresis, fatigue and fever.   HENT: Negative for hearing loss.    Eyes: Negative for visual disturbance.   Respiratory: Negative for cough, shortness of breath, wheezing and stridor.    Cardiovascular: Positive for leg swelling. Negative for chest pain and palpitations.   Gastrointestinal: Negative for abdominal pain, constipation, diarrhea, nausea and vomiting.   Genitourinary: Negative for dysuria.   Musculoskeletal: Positive for gait problem and joint swelling.   Skin: Negative for rash and wound.   Neurological: Negative for dizziness, tremors, facial asymmetry and weakness.   Psychiatric/Behavioral: Negative for confusion.           Objective     Vital Signs  Temp:  [97.8 °F (36.6 °C)-98.9 °F (37.2 °C)] 97.9 °F (36.6 °C)  Heart Rate:  [] 127  Resp:  [17-19] 17  BP: (117-155)/(71-79) 117/78    Physical Exam: No SCDs in place     General Appearance:    Alert, cooperative, in no acute distress,   Head:    Normocephalic, without obvious abnormality, atraumatic   Eyes:            Lids and lashes normal, conjunctivae and sclerae normal, no   icterus, no pallor, corneas clear, PERRLA   Ears:    Ears appear intact with no abnormalities noted   Throat:   No oral lesions, no thrush, oral mucosa moist   Neck:   No adenopathy, supple, trachea midline, no thyromegaly, no   carotid bruit, no JVD   Lungs:     Clear to auscultation,respirations regular, even and                  unlabored    Heart:    Regular rhythm and normal rate, normal S1 and S2, no            murmur, no gallop, no rub, no click   Chest Wall:    No abnormalities observed   Abdomen:     Normal bowel sounds, no masses, no organomegaly, soft         Non-tender non-distended, no guarding,   Extremities:  Edema left leg from knee to foot   Pulses:   Pulses palpable and equal bilaterally   Skin:   No bleeding, bruising or rash   Lymph nodes:   No palpable adenopathy   Neurologic:   Cranial nerves 2 - 12 grossly intact, sensation intact, DTR       present and equal bilaterally        Results Review:    Lab Results (last 24 hours)     Procedure Component Value Units Date/Time    Basic Metabolic Panel [[id number]]  (Abnormal) Collected: 10/08/22 0936    Specimen: Blood Updated: 10/08/22 1042     Glucose 124 mg/dL      BUN 29 mg/dL      Creatinine 1.05 mg/dL      Sodium 135 mmol/L      Potassium 4.1 mmol/L      Chloride 98 mmol/L      CO2 25.0 mmol/L      Calcium 9.7 mg/dL      BUN/Creatinine Ratio 27.6     Anion Gap 12.0 mmol/L      eGFR 76.8 mL/min/1.73      Comment: National Kidney Foundation and American Society of Nephrology (ASN) Task Force recommended calculation based on the Chronic Kidney Disease Epidemiology Collaboration (CKD-EPI) equation refit without adjustment for race.       Narrative:      GFR Normal >60  Chronic Kidney Disease <60  Kidney Failure <15      CBC (No Diff) [[id number]]  (Abnormal) Collected: 10/08/22 0936    Specimen: Blood Updated: 10/08/22 1018     WBC 12.20 10*3/mm3      RBC 4.06 10*6/mm3      Hemoglobin 12.3 g/dL      Hematocrit 38.2 %      MCV 94.0 fL      MCH 30.2 pg      MCHC 32.1 g/dL      RDW 14.5 %      RDW-SD 47.3 fl      MPV 8.3 fL      Platelets 349 10*3/mm3     Blood Culture - Blood, Hand, Left [[id number]]  (Normal) Collected: 10/03/22 0249    Specimen: Blood from Hand, Left Updated: 10/08/22 0400     Blood Culture No growth at 5 days    Blood Culture - Blood, Hand, Right [[id number]]  (Normal) Collected: 10/03/22 0249    Specimen: Blood from Hand, Right Updated: 10/08/22 0400     Blood Culture No growth at 5 days    Uric Acid [[id number]]  (Normal) Collected: 10/07/22 2234    Specimen: Blood Updated: 10/08/22 0106      Uric Acid 4.7 mg/dL     Sedimentation Rate [[id number]]  (Abnormal) Collected: 10/07/22 2234    Specimen: Blood Updated: 10/08/22 0045     Sed Rate 89 mm/hr     Rheumatoid Factor [[id number]] Collected: 10/07/22 2234    Specimen: Blood Updated: 10/08/22 0038           Imaging Results (Last 24 Hours)     ** No results found for the last 24 hours. **               I reviewed the patient's new clinical results.    Medication Review:   Scheduled Meds:allopurinol, 300 mg, Oral, Daily  colchicine, 0.6 mg, Oral, BID  Diclofenac Sodium, 4 g, Topical, 4x Daily  lisinopril, 20 mg, Oral, Q24H  pantoprazole, 40 mg, Oral, Q AM  predniSONE, 20 mg, Oral, Daily With Breakfast  sodium chloride, 3 mL, Intravenous, Q12H      Continuous Infusions:   PRN Meds:.•  acetaminophen  •  acetaminophen  •  HYDROcodone-acetaminophen  •  melatonin  •  Morphine  •  ondansetron **OR** ondansetron  •  [COMPLETED] Insert peripheral IV **AND** sodium chloride  •  sodium chloride     Assessment & Plan       Edema of right upper extremity    Right ankle pain  Left leg pain -concerning for DVT; lower extremity venous Dopplers pending  Acute gouty arthritis -improving daily with prednisone, allopurinol, colchicine  Migratory arthralgias -BOONE is negative, sed rate remains elevated although trending down, rheumatoid factor is pending  Essential hypertension -lisinopril        Plan for disposition: Skilled rehab when able    Candida Robison MD  10/08/22  12:46 EDT

## 2022-10-09 NOTE — PROCEDURES
"Insert Central Line At Bedside    Date/Time: 10/9/2022 3:42 PM  Performed by: Grover Amador MD  Authorized by: Grover Amador MD   Consent: The procedure was performed in an emergent situation. Verbal consent not obtained. Written consent not obtained.  Patient identity confirmed: arm band, provided demographic data and hospital-assigned identification number  Time out: Immediately prior to procedure a \"time out\" was called to verify the correct patient, procedure, equipment, support staff and site/side marked as required.  Indications: vascular access and central pressure monitoring    Sedation:  Patient sedated: no    Preparation: skin prepped with 2% chlorhexidine  Sterile barriers: all five maximum sterile barriers used - cap, mask, sterile gown, sterile gloves, and large sterile sheet  Hand hygiene: hand hygiene performed prior to central venous catheter insertion  Location details: right femoral  Patient position: flat  Catheter type: triple lumen  Ultrasound guidance: no  Number of attempts: 1  Successful placement: yes  Post-procedure: line sutured  Assessment: blood return through all ports  Patient tolerance: patient tolerated the procedure well with no immediate complications        "

## 2022-10-09 NOTE — PROCEDURES
"Intubation    Date/Time: 10/9/2022 9:50 AM  Performed by: Grover Amador MD  Authorized by: Grover Amador MD   Consent: The procedure was performed in an emergent situation. Verbal consent not obtained. Written consent not obtained.  Patient identity confirmed: verbally with patient and arm band  Time out: Immediately prior to procedure a \"time out\" was called to verify the correct patient, procedure, equipment, support staff and site/side marked as required.  Indications: respiratory distress,  respiratory failure,  airway protection and  hypoxemia  Intubation method: direct  Patient status: unconscious  Preoxygenation: BVM  Laryngoscope size: Rodriguez 3  Tube size: 8.0 mm  Tube type: cuffed  Number of attempts: 1  Ventilation between attempts: BVM  Cords visualized: yes  Post-procedure assessment: chest rise  Breath sounds: equal  Cuff inflated: yes  Tube secured with: ETT santacruz  Patient tolerance: patient tolerated the procedure well with no immediate complications        "

## 2022-10-09 NOTE — DISCHARGE SUMMARY
Date of Death:  10/9/2022  Cause of Death: Acute pulmonary embolism  Final Diagnosis: Acute pulmonary embolism, left leg dvt, migratory polyarthralgias, acute gouty arthritis    Presenting Problem/History of Present Illness  Active Hospital Problems    Diagnosis  POA   • Right ankle pain [M25.571]  Unknown   • Edema of right upper extremity [R60.0]  Yes      Resolved Hospital Problems   No resolved problems to display.         Hospital Course  Patient was a 67 y.o. male presented with inability to walk due to bilateral ankle and right wrist pain and swelling.  He had h/o frequent gout flares.  Symptoms improved with steroids, colchicine, allopurinol.  He developed left leg swelling and was found to have dvt,, started on Lovenox.  This morning he had sudden onset of soa and became unresponsive and pulseless.  Code team performed appropriate measures, including TPA x 2.  He did not regain a pulse and resuscitative measures were stopped.  Sister, Moris, was present at the time of death.      Procedures Performed         Consults:   Consults     No orders found from 9/3/2022 to 10/3/2022.              Candida Robison MD  10/09/22  10:48 EDT

## 2022-10-09 NOTE — PROGRESS NOTES
Code blue called  Responded immediately  High suspicion for acute recurrent PE  Got agitated , tried to get up, then became lethargic , laid back and lost pulse   Full ACLS protocol initiated  Pt intubated and CVC placed  Administered epinephrine per protocol  Also given magnesium, calcium and sodium bicarb through the code  See code sheet for more details  Developed VFIB arrest and defibrillated twice and also 300mg amiodarone administered  TPA total 100mg given in 2 doses as d/w pharmacy and CPR continued     discussed with his sister in person through the code.  Despite aggressive resuscitatory efforts patient did not have ROSC and  at 10:34am  Sister informed in person  dw Dr Robison as well

## 2022-10-09 NOTE — PROGRESS NOTES
Responded to Rapid Response at this time.  All appropriate personnel in room to assist.  ABG obtained but as results were being ran, patient lost pulse.  CPR immediately started and CODE was called.  Again all appropriate personnel arrived to room to assist.  See CODE sheet for details.  Despite all efforts patient did not respond to our treatments and was pronounced by MD at bedside.

## 2022-10-09 NOTE — PLAN OF CARE
Goal Outcome Evaluation:  Plan of Care Reviewed With: patient        Progress: no change  Outcome Evaluation: Patient has a DVT and PE. Was started on Lovenox yesterday. Ext cath in place. Waiting on placement.

## 2022-10-09 NOTE — CODE DOCUMENTATION
Rapid response initiated. Patient was clammy and unresponsive at RN's arrival. Patient became restless, trying to get out of bed, and uncooperative.     Unable to get a blood pressure on patient for several minutes, fluids were started and called was put out to [facility].     0914 patient lost the first pulse.   0917 after one round epi and cpr, patient was responsive and had a pulse.  0931 patient lost pulse again, initiated CPR, patient started fighting    Providers at bedside trying to communicate with the patient and proceed with intubation. Patient very agitated and fighting.    0943 patient lost pulse again and CPR was initiated until the code was ultimately called with TOD at 1034 per Dr. RAYMON Amador

## 2022-10-10 NOTE — CASE MANAGEMENT/SOCIAL WORK
Case Management Discharge Note      Final Note: .    Selected Continued Care - Discharged on 10/9/2022 Admission date: 10/2/2022 - Discharge disposition:           Final Discharge Disposition Code: 41 -  in medical facility